# Patient Record
Sex: FEMALE | Race: WHITE | NOT HISPANIC OR LATINO
[De-identification: names, ages, dates, MRNs, and addresses within clinical notes are randomized per-mention and may not be internally consistent; named-entity substitution may affect disease eponyms.]

---

## 2017-02-13 ENCOUNTER — APPOINTMENT (OUTPATIENT)
Dept: INTERNAL MEDICINE | Facility: CLINIC | Age: 42
End: 2017-02-13

## 2017-02-13 VITALS
SYSTOLIC BLOOD PRESSURE: 141 MMHG | WEIGHT: 282 LBS | TEMPERATURE: 98.7 F | HEART RATE: 78 BPM | BODY MASS INDEX: 41.77 KG/M2 | OXYGEN SATURATION: 100 % | RESPIRATION RATE: 12 BRPM | HEIGHT: 69 IN | DIASTOLIC BLOOD PRESSURE: 85 MMHG

## 2017-02-15 LAB
25(OH)D3 SERPL-MCNC: 15.3 NG/ML
ALBUMIN SERPL ELPH-MCNC: 3.9 G/DL
ALP BLD-CCNC: 66 U/L
ALT SERPL-CCNC: 7 U/L
ANION GAP SERPL CALC-SCNC: 18 MMOL/L
AST SERPL-CCNC: 13 U/L
BILIRUB SERPL-MCNC: 0.2 MG/DL
BUN SERPL-MCNC: 16 MG/DL
CALCIUM SERPL-MCNC: 9.5 MG/DL
CHLORIDE SERPL-SCNC: 102 MMOL/L
CHOLEST SERPL-MCNC: 218 MG/DL
CHOLEST/HDLC SERPL: 4.4 RATIO
CO2 SERPL-SCNC: 22 MMOL/L
CREAT SERPL-MCNC: 0.87 MG/DL
GLUCOSE SERPL-MCNC: 93 MG/DL
HBA1C MFR BLD HPLC: 5.5 %
HDLC SERPL-MCNC: 50 MG/DL
LDLC SERPL CALC-MCNC: 137 MG/DL
POTASSIUM SERPL-SCNC: 3.6 MMOL/L
PROT SERPL-MCNC: 7.2 G/DL
SODIUM SERPL-SCNC: 142 MMOL/L
TRIGL SERPL-MCNC: 154 MG/DL
TSH SERPL-ACNC: 1.3 UIU/ML
VIT B12 SERPL-MCNC: 329 PG/ML

## 2017-02-17 LAB
BASOPHILS # BLD AUTO: 0.03 K/UL
BASOPHILS NFR BLD AUTO: 0.4 %
EOSINOPHIL # BLD AUTO: 0.2 K/UL
EOSINOPHIL NFR BLD AUTO: 2.4 %
HCT VFR BLD CALC: 42.3 %
HGB BLD-MCNC: 13.6 G/DL
IMM GRANULOCYTES NFR BLD AUTO: 0.1 %
LYMPHOCYTES # BLD AUTO: 2.32 K/UL
LYMPHOCYTES NFR BLD AUTO: 27.4 %
MAN DIFF?: NORMAL
MCHC RBC-ENTMCNC: 29.2 PG
MCHC RBC-ENTMCNC: 32.2 GM/DL
MCV RBC AUTO: 90.8 FL
MONOCYTES # BLD AUTO: 0.35 K/UL
MONOCYTES NFR BLD AUTO: 4.1 %
NEUTROPHILS # BLD AUTO: 5.57 K/UL
NEUTROPHILS NFR BLD AUTO: 65.6 %
PLATELET # BLD AUTO: 342 K/UL
RBC # BLD: 4.66 M/UL
RBC # FLD: 13.2 %
WBC # FLD AUTO: 8.48 K/UL

## 2018-02-06 ENCOUNTER — LABORATORY RESULT (OUTPATIENT)
Age: 43
End: 2018-02-06

## 2018-02-06 ENCOUNTER — OUTPATIENT (OUTPATIENT)
Dept: OUTPATIENT SERVICES | Facility: HOSPITAL | Age: 43
LOS: 1 days | End: 2018-02-06
Payer: COMMERCIAL

## 2018-02-06 ENCOUNTER — APPOINTMENT (OUTPATIENT)
Dept: OBGYN | Facility: CLINIC | Age: 43
End: 2018-02-06
Payer: COMMERCIAL

## 2018-02-06 VITALS — SYSTOLIC BLOOD PRESSURE: 120 MMHG | WEIGHT: 293 LBS | DIASTOLIC BLOOD PRESSURE: 80 MMHG | BODY MASS INDEX: 43.56 KG/M2

## 2018-02-06 DIAGNOSIS — N76.0 ACUTE VAGINITIS: ICD-10-CM

## 2018-02-06 PROCEDURE — 87624 HPV HI-RISK TYP POOLED RSLT: CPT

## 2018-02-06 PROCEDURE — G0463: CPT

## 2018-02-06 PROCEDURE — 88175 CYTOPATH C/V AUTO FLUID REDO: CPT

## 2018-02-06 PROCEDURE — XXXXX: CPT

## 2018-02-06 PROCEDURE — 88141 CYTOPATH C/V INTERPRET: CPT

## 2018-02-07 LAB
C TRACH RRNA SPEC QL NAA+PROBE: SIGNIFICANT CHANGE UP
HPV HIGH+LOW RISK DNA PNL CVX: SIGNIFICANT CHANGE UP
N GONORRHOEA RRNA SPEC QL NAA+PROBE: SIGNIFICANT CHANGE UP
SPECIMEN SOURCE: SIGNIFICANT CHANGE UP

## 2018-02-11 ENCOUNTER — FORM ENCOUNTER (OUTPATIENT)
Age: 43
End: 2018-02-11

## 2018-02-12 ENCOUNTER — OUTPATIENT (OUTPATIENT)
Dept: OUTPATIENT SERVICES | Facility: HOSPITAL | Age: 43
LOS: 1 days | End: 2018-02-12
Payer: COMMERCIAL

## 2018-02-12 ENCOUNTER — APPOINTMENT (OUTPATIENT)
Dept: MAMMOGRAPHY | Facility: IMAGING CENTER | Age: 43
End: 2018-02-12
Payer: COMMERCIAL

## 2018-02-12 DIAGNOSIS — Z00.8 ENCOUNTER FOR OTHER GENERAL EXAMINATION: ICD-10-CM

## 2018-02-12 PROCEDURE — 77063 BREAST TOMOSYNTHESIS BI: CPT

## 2018-02-12 PROCEDURE — 77067 SCR MAMMO BI INCL CAD: CPT

## 2018-02-12 PROCEDURE — 77063 BREAST TOMOSYNTHESIS BI: CPT | Mod: 26

## 2018-02-12 PROCEDURE — 77067 SCR MAMMO BI INCL CAD: CPT | Mod: 26

## 2018-02-13 LAB — CYTOLOGY SPEC DOC CYTO: SIGNIFICANT CHANGE UP

## 2018-02-15 DIAGNOSIS — Z01.419 ENCOUNTER FOR GYNECOLOGICAL EXAMINATION (GENERAL) (ROUTINE) WITHOUT ABNORMAL FINDINGS: ICD-10-CM

## 2018-04-16 ENCOUNTER — LABORATORY RESULT (OUTPATIENT)
Age: 43
End: 2018-04-16

## 2018-04-16 ENCOUNTER — APPOINTMENT (OUTPATIENT)
Dept: INTERNAL MEDICINE | Facility: CLINIC | Age: 43
End: 2018-04-16
Payer: COMMERCIAL

## 2018-04-16 VITALS
HEART RATE: 80 BPM | TEMPERATURE: 97.7 F | DIASTOLIC BLOOD PRESSURE: 77 MMHG | OXYGEN SATURATION: 98 % | SYSTOLIC BLOOD PRESSURE: 117 MMHG | RESPIRATION RATE: 12 BRPM | HEIGHT: 69 IN | BODY MASS INDEX: 43.4 KG/M2 | WEIGHT: 293 LBS

## 2018-04-16 PROCEDURE — 99214 OFFICE O/P EST MOD 30 MIN: CPT

## 2018-04-23 LAB
HBA1C MFR BLD HPLC: 5.6 %
TSH SERPL-ACNC: 1.8 UIU/ML

## 2018-04-25 LAB
ALBUMIN SERPL ELPH-MCNC: 4.2 G/DL
ALP BLD-CCNC: 68 U/L
ALT SERPL-CCNC: 12 U/L
ANION GAP SERPL CALC-SCNC: 13 MMOL/L
AST SERPL-CCNC: 14 U/L
BILIRUB SERPL-MCNC: 0.3 MG/DL
BUN SERPL-MCNC: 12 MG/DL
CALCIUM SERPL-MCNC: 9.7 MG/DL
CHLORIDE SERPL-SCNC: 102 MMOL/L
CO2 SERPL-SCNC: 25 MMOL/L
CREAT SERPL-MCNC: 0.85 MG/DL
GLUCOSE SERPL-MCNC: 66 MG/DL
POTASSIUM SERPL-SCNC: 4.1 MMOL/L
PROT SERPL-MCNC: 7.5 G/DL
SODIUM SERPL-SCNC: 140 MMOL/L

## 2018-04-30 LAB
HSV 1+2 IGG SER IA-IMP: POSITIVE
HSV 1+2 IGG SER IA-IMP: POSITIVE
HSV1 IGG SER QL: 3.32 INDEX
HSV1 IGM SER QL: NORMAL TITER
HSV2 AB FLD-ACNC: NORMAL TITER
HSV2 IGG SER QL: 8.64 INDEX

## 2018-05-02 LAB
25(OH)D3 SERPL-MCNC: 27.5 NG/ML
APPEARANCE: CLEAR
BILIRUBIN URINE: NEGATIVE
BLOOD URINE: NEGATIVE
CHOLEST SERPL-MCNC: 208 MG/DL
CHOLEST/HDLC SERPL: 4 RATIO
COLOR: YELLOW
GLUCOSE QUALITATIVE U: NEGATIVE MG/DL
HDLC SERPL-MCNC: 52 MG/DL
KETONES URINE: NEGATIVE
LDLC SERPL CALC-MCNC: 133 MG/DL
LEUKOCYTE ESTERASE URINE: ABNORMAL
NITRITE URINE: NEGATIVE
PH URINE: 5.5
PROTEIN URINE: NEGATIVE MG/DL
SPECIFIC GRAVITY URINE: 1.01
TRIGL SERPL-MCNC: 115 MG/DL
UROBILINOGEN URINE: NEGATIVE MG/DL
VIT B12 SERPL-MCNC: 312 PG/ML

## 2018-05-03 DIAGNOSIS — D72.829 ELEVATED WHITE BLOOD CELL COUNT, UNSPECIFIED: ICD-10-CM

## 2018-05-03 LAB
BASOPHILS # BLD AUTO: 0.04 K/UL
BASOPHILS NFR BLD AUTO: 0.3 %
EOSINOPHIL # BLD AUTO: 0.17 K/UL
EOSINOPHIL NFR BLD AUTO: 1.5 %
HCT VFR BLD CALC: 43 %
HGB BLD-MCNC: 13.6 G/DL
IMM GRANULOCYTES NFR BLD AUTO: 0.3 %
LYMPHOCYTES # BLD AUTO: 2.58 K/UL
LYMPHOCYTES NFR BLD AUTO: 22 %
MAN DIFF?: NORMAL
MCHC RBC-ENTMCNC: 29.6 PG
MCHC RBC-ENTMCNC: 31.6 GM/DL
MCV RBC AUTO: 93.5 FL
MONOCYTES # BLD AUTO: 0.61 K/UL
MONOCYTES NFR BLD AUTO: 5.2 %
NEUTROPHILS # BLD AUTO: 8.28 K/UL
NEUTROPHILS NFR BLD AUTO: 70.7 %
PLATELET # BLD AUTO: 336 K/UL
RBC # BLD: 4.6 M/UL
RBC # FLD: 13.8 %
WBC # FLD AUTO: 11.71 K/UL

## 2019-01-02 ENCOUNTER — APPOINTMENT (OUTPATIENT)
Dept: INTERNAL MEDICINE | Facility: CLINIC | Age: 44
End: 2019-01-02
Payer: COMMERCIAL

## 2019-01-02 VITALS
HEART RATE: 98 BPM | DIASTOLIC BLOOD PRESSURE: 85 MMHG | RESPIRATION RATE: 12 BRPM | BODY MASS INDEX: 43.4 KG/M2 | TEMPERATURE: 98.9 F | OXYGEN SATURATION: 96 % | HEIGHT: 69 IN | WEIGHT: 293 LBS | SYSTOLIC BLOOD PRESSURE: 136 MMHG

## 2019-01-02 PROCEDURE — 99214 OFFICE O/P EST MOD 30 MIN: CPT

## 2019-01-02 NOTE — PHYSICAL EXAM
[No Acute Distress] : no acute distress [Well Nourished] : well nourished [Well Developed] : well developed [Normal Sclera/Conjunctiva] : normal sclera/conjunctiva [Normal Outer Ear/Nose] : the outer ears and nose were normal in appearance [No JVD] : no jugular venous distention [No Respiratory Distress] : no respiratory distress  [Clear to Auscultation] : lungs were clear to auscultation bilaterally [No Accessory Muscle Use] : no accessory muscle use [Normal Rate] : normal rate  [Regular Rhythm] : with a regular rhythm [Normal S1, S2] : normal S1 and S2 [No Murmur] : no murmur heard [No Edema] : there was no peripheral edema [Soft] : abdomen soft [Non Tender] : non-tender [Non-distended] : non-distended [No Rash] : no rash [Normal Gait] : normal gait [Normal Affect] : the affect was normal [Normal Insight/Judgement] : insight and judgment were intact [de-identified] : ROM are within normal limits with pt c/o discomfort in rt body rotation
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
## 2019-03-19 ENCOUNTER — LABORATORY RESULT (OUTPATIENT)
Age: 44
End: 2019-03-19

## 2019-03-19 ENCOUNTER — APPOINTMENT (OUTPATIENT)
Dept: OBGYN | Facility: CLINIC | Age: 44
End: 2019-03-19
Payer: COMMERCIAL

## 2019-03-19 VITALS
DIASTOLIC BLOOD PRESSURE: 80 MMHG | HEIGHT: 69 IN | WEIGHT: 293 LBS | BODY MASS INDEX: 43.4 KG/M2 | SYSTOLIC BLOOD PRESSURE: 126 MMHG

## 2019-03-19 DIAGNOSIS — R82.90 UNSPECIFIED ABNORMAL FINDINGS IN URINE: ICD-10-CM

## 2019-03-19 DIAGNOSIS — Z87.440 PERSONAL HISTORY OF URINARY (TRACT) INFECTIONS: ICD-10-CM

## 2019-03-19 DIAGNOSIS — Z87.898 PERSONAL HISTORY OF OTHER SPECIFIED CONDITIONS: ICD-10-CM

## 2019-03-19 PROCEDURE — 99396 PREV VISIT EST AGE 40-64: CPT

## 2019-03-19 NOTE — PHYSICAL EXAM
[Awake] : awake [Alert] : alert [Soft] : soft [Oriented x3] : oriented to person, place, and time [Normal] : uterus [No Bleeding] : there was no active vaginal bleeding [Uterine Adnexae] : were not tender and not enlarged [Acute Distress] : no acute distress [LAD] : no lymphadenopathy [Mass] : no breast mass [Nipple Discharge] : no nipple discharge [Axillary LAD] : no axillary lymphadenopathy [Tender] : non tender [Adnexa Tenderness] : were not tender [Ovarian Mass (___ Cm)] : there were no adnexal masses

## 2019-03-19 NOTE — COUNSELING
[Breast Self Exam] : breast self exam [Nutrition] : nutrition [Exercise] : exercise [Vitamins/Supplements] : vitamins/supplements [Contraception] : contraception [Fertility Options] : fertility options [Weight Management] : weight management [Safe Sexual Practices] : safe sexual practices

## 2019-03-20 LAB
FSH SERPL-MCNC: 13.7 IU/L
PROLACTIN SERPL-MCNC: 9.5 NG/ML
TSH SERPL-ACNC: 1.64 UIU/ML

## 2019-03-28 LAB — CYTOLOGY CVX/VAG DOC THIN PREP: NORMAL

## 2019-04-07 ENCOUNTER — FORM ENCOUNTER (OUTPATIENT)
Age: 44
End: 2019-04-07

## 2019-04-08 ENCOUNTER — APPOINTMENT (OUTPATIENT)
Dept: MAMMOGRAPHY | Facility: IMAGING CENTER | Age: 44
End: 2019-04-08
Payer: COMMERCIAL

## 2019-04-08 ENCOUNTER — OUTPATIENT (OUTPATIENT)
Dept: OUTPATIENT SERVICES | Facility: HOSPITAL | Age: 44
LOS: 1 days | End: 2019-04-08
Payer: COMMERCIAL

## 2019-04-08 DIAGNOSIS — Z01.419 ENCOUNTER FOR GYNECOLOGICAL EXAMINATION (GENERAL) (ROUTINE) WITHOUT ABNORMAL FINDINGS: ICD-10-CM

## 2019-04-08 PROCEDURE — 77067 SCR MAMMO BI INCL CAD: CPT | Mod: 26

## 2019-04-08 PROCEDURE — 77067 SCR MAMMO BI INCL CAD: CPT

## 2019-04-08 PROCEDURE — 77063 BREAST TOMOSYNTHESIS BI: CPT | Mod: 26

## 2019-04-08 PROCEDURE — 77063 BREAST TOMOSYNTHESIS BI: CPT

## 2019-04-22 ENCOUNTER — LABORATORY RESULT (OUTPATIENT)
Age: 44
End: 2019-04-22

## 2019-04-22 ENCOUNTER — APPOINTMENT (OUTPATIENT)
Dept: INTERNAL MEDICINE | Facility: CLINIC | Age: 44
End: 2019-04-22
Payer: COMMERCIAL

## 2019-04-22 VITALS
RESPIRATION RATE: 12 BRPM | WEIGHT: 293 LBS | HEART RATE: 79 BPM | OXYGEN SATURATION: 99 % | SYSTOLIC BLOOD PRESSURE: 111 MMHG | BODY MASS INDEX: 43.4 KG/M2 | HEIGHT: 69 IN | DIASTOLIC BLOOD PRESSURE: 78 MMHG | TEMPERATURE: 98.4 F

## 2019-04-22 PROCEDURE — 99406 BEHAV CHNG SMOKING 3-10 MIN: CPT

## 2019-04-22 PROCEDURE — 99396 PREV VISIT EST AGE 40-64: CPT | Mod: 25

## 2019-04-22 NOTE — COUNSELING
[Healthy eating counseling provided] : healthy eating [Discussed Risks and Advised to Quit Smoking] : Discussed risks and advised to quit smoking [Discussed Cessation Medication] : cessation medication was discussed [Discussed Cessation Strategies] : cessation strategies were discussed

## 2019-04-22 NOTE — PHYSICAL EXAM
[No Acute Distress] : no acute distress [Well Nourished] : well nourished [Normal Sclera/Conjunctiva] : normal sclera/conjunctiva [Well-Appearing] : well-appearing [Well Developed] : well developed [EOMI] : extraocular movements intact [PERRL] : pupils equal round and reactive to light [Normal Oropharynx] : the oropharynx was normal [No JVD] : no jugular venous distention [Normal Outer Ear/Nose] : the outer ears and nose were normal in appearance [No Lymphadenopathy] : no lymphadenopathy [Supple] : supple [Thyroid Normal, No Nodules] : the thyroid was normal and there were no nodules present [No Respiratory Distress] : no respiratory distress  [Clear to Auscultation] : lungs were clear to auscultation bilaterally [No Accessory Muscle Use] : no accessory muscle use [Normal Rate] : normal rate  [Regular Rhythm] : with a regular rhythm [Normal S1, S2] : normal S1 and S2 [No Murmur] : no murmur heard [No Varicosities] : no varicosities [No Carotid Bruits] : no carotid bruits [No Abdominal Bruit] : a ~M bruit was not heard ~T in the abdomen [No Edema] : there was no peripheral edema [Pedal Pulses Present] : the pedal pulses are present [No Extremity Clubbing/Cyanosis] : no extremity clubbing/cyanosis [No Palpable Aorta] : no palpable aorta [Soft] : abdomen soft [Non Tender] : non-tender [Non-distended] : non-distended [No Masses] : no abdominal mass palpated [No HSM] : no HSM [Normal Bowel Sounds] : normal bowel sounds [Normal Posterior Cervical Nodes] : no posterior cervical lymphadenopathy [Normal Anterior Cervical Nodes] : no anterior cervical lymphadenopathy [No Spinal Tenderness] : no spinal tenderness [No CVA Tenderness] : no CVA  tenderness [Grossly Normal Strength/Tone] : grossly normal strength/tone [No Joint Swelling] : no joint swelling [Normal Gait] : normal gait [Coordination Grossly Intact] : coordination grossly intact [No Rash] : no rash [No Focal Deficits] : no focal deficits [Deep Tendon Reflexes (DTR)] : deep tendon reflexes were 2+ and symmetric [Normal Affect] : the affect was normal [Normal Insight/Judgement] : insight and judgment were intact

## 2019-04-22 NOTE — ASSESSMENT
[FreeTextEntry1] : Physical\par blood work ordered\par She is UTD with her gyn/PAP, mammography\par \par Hx of herpes type II\par STD screening \par \par f/u in one week for lab results

## 2019-04-22 NOTE — HISTORY OF PRESENT ILLNESS
[de-identified] : Ms. LYDIA ENGLISH is a 44 year old female presents for annual physical\par She is doing well, offers no complaints\par

## 2019-04-22 NOTE — HEALTH RISK ASSESSMENT
[Alone] : lives alone [Single] : single [Employed] : employed [Sexually Active] : sexually active [] : No [de-identified] : socially [MammogramDate] : last week [PapSmearDate] : march 19, 2019 [FreeTextEntry2] :

## 2019-04-23 LAB
ALBUMIN SERPL ELPH-MCNC: 4.3 G/DL
ALP BLD-CCNC: 78 U/L
ALT SERPL-CCNC: 21 U/L
ANION GAP SERPL CALC-SCNC: 15 MMOL/L
AST SERPL-CCNC: 15 U/L
BASOPHILS # BLD AUTO: 0.06 K/UL
BASOPHILS NFR BLD AUTO: 0.6 %
BILIRUB SERPL-MCNC: 0.2 MG/DL
BUN SERPL-MCNC: 11 MG/DL
CALCIUM SERPL-MCNC: 9.3 MG/DL
CHLORIDE SERPL-SCNC: 106 MMOL/L
CO2 SERPL-SCNC: 20 MMOL/L
CREAT SERPL-MCNC: 0.66 MG/DL
EOSINOPHIL # BLD AUTO: 0.25 K/UL
EOSINOPHIL NFR BLD AUTO: 2.5 %
GLUCOSE SERPL-MCNC: 89 MG/DL
HCT VFR BLD CALC: 44.6 %
HGB BLD-MCNC: 14.1 G/DL
IMM GRANULOCYTES NFR BLD AUTO: 0.2 %
LYMPHOCYTES # BLD AUTO: 2.57 K/UL
LYMPHOCYTES NFR BLD AUTO: 26.1 %
MAN DIFF?: NORMAL
MCHC RBC-ENTMCNC: 29.7 PG
MCHC RBC-ENTMCNC: 31.6 GM/DL
MCV RBC AUTO: 93.9 FL
MONOCYTES # BLD AUTO: 0.54 K/UL
MONOCYTES NFR BLD AUTO: 5.5 %
NEUTROPHILS # BLD AUTO: 6.39 K/UL
NEUTROPHILS NFR BLD AUTO: 65.1 %
PLATELET # BLD AUTO: 336 K/UL
POTASSIUM SERPL-SCNC: 4.5 MMOL/L
PROT SERPL-MCNC: 7 G/DL
RBC # BLD: 4.75 M/UL
RBC # FLD: 13.1 %
SODIUM SERPL-SCNC: 141 MMOL/L
TSH SERPL-ACNC: 0.86 UIU/ML
WBC # FLD AUTO: 9.83 K/UL

## 2019-04-24 ENCOUNTER — APPOINTMENT (OUTPATIENT)
Dept: OBGYN | Facility: CLINIC | Age: 44
End: 2019-04-24
Payer: COMMERCIAL

## 2019-04-24 VITALS
HEIGHT: 69 IN | SYSTOLIC BLOOD PRESSURE: 130 MMHG | WEIGHT: 293 LBS | BODY MASS INDEX: 43.4 KG/M2 | DIASTOLIC BLOOD PRESSURE: 78 MMHG

## 2019-04-24 DIAGNOSIS — Z87.42 PERSONAL HISTORY OF OTHER DISEASES OF THE FEMALE GENITAL TRACT: ICD-10-CM

## 2019-04-24 LAB — HPV HIGH+LOW RISK DNA PNL CVX: DETECTED

## 2019-04-24 PROCEDURE — 57456 ENDOCERV CURETTAGE W/SCOPE: CPT

## 2019-04-24 PROCEDURE — 81025 URINE PREGNANCY TEST: CPT

## 2019-04-24 RX ORDER — CIPROFLOXACIN HYDROCHLORIDE 500 MG/1
500 TABLET, FILM COATED ORAL
Qty: 14 | Refills: 0 | Status: COMPLETED | COMMUNITY
Start: 2017-02-13 | End: 2019-04-24

## 2019-04-24 NOTE — PROCEDURE
[Colposcopy] : colposcopy [ASCUS] : atypical squamous cells of undetermined significance [HPV high risk] : PCR positive for high risk HPV [Patient] : patient [Risks] : risks [Infection] : infection [Benefits] : benefits [Bleeding] : bleeding [Consent Obtained] : written consent was obtained prior to the procedure [No Abnormalities] : no abnormalities [Biopsies Taken: # ___] : no biopsies were taken of the cervix [ECC Done] : Endocervical curettage was performed.  [Tolerated Well] : the patient tolerated the procedure well [No Complications] : there were no complications

## 2019-04-25 LAB
C TRACH RRNA SPEC QL NAA+PROBE: NOT DETECTED
HAV IGM SER QL: NONREACTIVE
HBV CORE IGM SER QL: NONREACTIVE
HBV SURFACE AG SER QL: NONREACTIVE
HCV AB SER QL: NONREACTIVE
HCV S/CO RATIO: 0.11 S/CO
HIV1+2 AB SPEC QL IA.RAPID: NONREACTIVE
HSV1 IGM SER QL: NORMAL TITER
HSV2 AB FLD-ACNC: NORMAL TITER
N GONORRHOEA RRNA SPEC QL NAA+PROBE: NOT DETECTED
SOURCE TP AMPLIFICATION: NORMAL
T PALLIDUM AB SER QL IA: NEGATIVE

## 2019-04-29 LAB
25(OH)D3 SERPL-MCNC: 30.8 NG/ML
CHOLEST SERPL-MCNC: 180 MG/DL
CHOLEST/HDLC SERPL: 3.8 RATIO
ESTIMATED AVERAGE GLUCOSE: 117 MG/DL
HBA1C MFR BLD HPLC: 5.7 %
HDLC SERPL-MCNC: 47 MG/DL
HSV 1+2 IGG SER IA-IMP: POSITIVE
HSV 1+2 IGG SER IA-IMP: POSITIVE
HSV1 IGG SER QL: 3.54 INDEX
HSV2 IGG SER QL: 8.85 INDEX
LDLC SERPL CALC-MCNC: 109 MG/DL
TRIGL SERPL-MCNC: 122 MG/DL
VIT B12 SERPL-MCNC: 467 PG/ML

## 2019-04-30 LAB
APPEARANCE: ABNORMAL
BILIRUBIN URINE: NEGATIVE
BLOOD URINE: NEGATIVE
COLOR: YELLOW
GLUCOSE QUALITATIVE U: NEGATIVE
KETONES URINE: NEGATIVE
LEUKOCYTE ESTERASE URINE: NEGATIVE
NITRITE URINE: NEGATIVE
PH URINE: 6.5
PROTEIN URINE: NORMAL
SPECIFIC GRAVITY URINE: 1.02
UROBILINOGEN URINE: NORMAL

## 2019-05-01 LAB — CORE LAB BIOPSY: NORMAL

## 2019-05-21 ENCOUNTER — APPOINTMENT (OUTPATIENT)
Dept: INTERNAL MEDICINE | Facility: CLINIC | Age: 44
End: 2019-05-21
Payer: COMMERCIAL

## 2019-05-21 ENCOUNTER — NON-APPOINTMENT (OUTPATIENT)
Age: 44
End: 2019-05-21

## 2019-05-21 VITALS
HEART RATE: 80 BPM | RESPIRATION RATE: 12 BRPM | DIASTOLIC BLOOD PRESSURE: 73 MMHG | SYSTOLIC BLOOD PRESSURE: 124 MMHG | BODY MASS INDEX: 43.4 KG/M2 | OXYGEN SATURATION: 99 % | HEIGHT: 69 IN | TEMPERATURE: 99.1 F | WEIGHT: 293 LBS

## 2019-05-21 PROCEDURE — 93000 ELECTROCARDIOGRAM COMPLETE: CPT

## 2019-05-21 PROCEDURE — 99214 OFFICE O/P EST MOD 30 MIN: CPT | Mod: 25

## 2019-06-09 NOTE — PHYSICAL EXAM
[No Acute Distress] : no acute distress [Well Nourished] : well nourished [Well Developed] : well developed [Well-Appearing] : well-appearing [Normal Sclera/Conjunctiva] : normal sclera/conjunctiva [Normal Outer Ear/Nose] : the outer ears and nose were normal in appearance [No JVD] : no jugular venous distention [No Lymphadenopathy] : no lymphadenopathy [No Respiratory Distress] : no respiratory distress  [Clear to Auscultation] : lungs were clear to auscultation bilaterally [No Accessory Muscle Use] : no accessory muscle use [Normal Rate] : normal rate  [Regular Rhythm] : with a regular rhythm [No Murmur] : no murmur heard [Normal S1, S2] : normal S1 and S2 [Soft] : abdomen soft [Non Tender] : non-tender [Normal Anterior Cervical Nodes] : no anterior cervical lymphadenopathy [No CVA Tenderness] : no CVA  tenderness [No Joint Swelling] : no joint swelling [No Rash] : no rash [Normal Gait] : normal gait [Normal Insight/Judgement] : insight and judgment were intact

## 2019-06-09 NOTE — HISTORY OF PRESENT ILLNESS
[de-identified] : 45 yo female, presents stating that she has been unable to loose weight. She has been dieting but is not loosing weight. She has tried different diets in the past and still has not been able to loose weight\par She had taken phentermine in the past and it had helped her and wants to try it again

## 2021-01-11 ENCOUNTER — APPOINTMENT (OUTPATIENT)
Dept: OBGYN | Facility: CLINIC | Age: 46
End: 2021-01-11
Payer: COMMERCIAL

## 2021-01-11 VITALS
WEIGHT: 293 LBS | BODY MASS INDEX: 43.4 KG/M2 | TEMPERATURE: 97.7 F | DIASTOLIC BLOOD PRESSURE: 80 MMHG | HEIGHT: 69 IN | SYSTOLIC BLOOD PRESSURE: 118 MMHG

## 2021-01-11 DIAGNOSIS — I80.00 PHLEBITIS AND THROMBOPHLEBITIS OF SUPERFICIAL VESSELS OF UNSPECIFIED LOWER EXTREMITY: ICD-10-CM

## 2021-01-11 DIAGNOSIS — H92.02 OTALGIA, LEFT EAR: ICD-10-CM

## 2021-01-11 DIAGNOSIS — Z87.39 PERSONAL HISTORY OF OTHER DISEASES OF THE MUSCULOSKELETAL SYSTEM AND CONNECTIVE TISSUE: ICD-10-CM

## 2021-01-11 DIAGNOSIS — Z87.42 PERSONAL HISTORY OF OTHER DISEASES OF THE FEMALE GENITAL TRACT: ICD-10-CM

## 2021-01-11 DIAGNOSIS — A60.00 HERPESVIRAL INFECTION OF UROGENITAL SYSTEM, UNSPECIFIED: ICD-10-CM

## 2021-01-11 DIAGNOSIS — Z11.3 ENCOUNTER FOR SCREENING FOR INFECTIONS WITH A PREDOMINANTLY SEXUAL MODE OF TRANSMISSION: ICD-10-CM

## 2021-01-11 PROCEDURE — 99396 PREV VISIT EST AGE 40-64: CPT

## 2021-01-11 PROCEDURE — 99072 ADDL SUPL MATRL&STAF TM PHE: CPT

## 2021-01-11 RX ORDER — PHENTERMINE HYDROCHLORIDE 37.5 MG/1
37.5 TABLET ORAL DAILY
Qty: 30 | Refills: 0 | Status: DISCONTINUED | COMMUNITY
Start: 2019-05-21 | End: 2021-01-11

## 2021-01-11 RX ORDER — NAPROXEN 500 MG/1
500 TABLET ORAL
Qty: 30 | Refills: 1 | Status: DISCONTINUED | COMMUNITY
Start: 2019-01-02 | End: 2021-01-11

## 2021-01-11 RX ORDER — CYCLOBENZAPRINE HYDROCHLORIDE 10 MG/1
10 TABLET, FILM COATED ORAL
Qty: 10 | Refills: 0 | Status: DISCONTINUED | COMMUNITY
Start: 2019-01-02 | End: 2021-01-11

## 2021-01-11 NOTE — HISTORY OF PRESENT ILLNESS
[FreeTextEntry1] : history of ASCUS/HPV positive\par Negative biopsy  [Mammogramdate] : 2019 [PapSmeardate] : 2019

## 2021-01-11 NOTE — PLAN
[FreeTextEntry1] : History of ASCUS/HPV positive- negative colposcopy \par \par Repeat pap done\par History of HSV-requesting suppression

## 2021-01-13 LAB — HPV HIGH+LOW RISK DNA PNL CVX: NOT DETECTED

## 2021-01-18 LAB — CYTOLOGY CVX/VAG DOC THIN PREP: ABNORMAL

## 2021-02-22 ENCOUNTER — LABORATORY RESULT (OUTPATIENT)
Age: 46
End: 2021-02-22

## 2021-02-22 ENCOUNTER — APPOINTMENT (OUTPATIENT)
Dept: INTERNAL MEDICINE | Facility: CLINIC | Age: 46
End: 2021-02-22
Payer: COMMERCIAL

## 2021-02-22 VITALS
OXYGEN SATURATION: 96 % | HEART RATE: 82 BPM | WEIGHT: 293 LBS | DIASTOLIC BLOOD PRESSURE: 85 MMHG | BODY MASS INDEX: 43.4 KG/M2 | RESPIRATION RATE: 12 BRPM | HEIGHT: 69 IN | TEMPERATURE: 97.5 F | SYSTOLIC BLOOD PRESSURE: 142 MMHG

## 2021-02-22 VITALS — DIASTOLIC BLOOD PRESSURE: 82 MMHG | SYSTOLIC BLOOD PRESSURE: 128 MMHG

## 2021-02-22 PROCEDURE — 99406 BEHAV CHNG SMOKING 3-10 MIN: CPT

## 2021-02-22 PROCEDURE — 99396 PREV VISIT EST AGE 40-64: CPT

## 2021-02-22 PROCEDURE — 99072 ADDL SUPL MATRL&STAF TM PHE: CPT

## 2021-03-02 ENCOUNTER — APPOINTMENT (OUTPATIENT)
Dept: MAMMOGRAPHY | Facility: IMAGING CENTER | Age: 46
End: 2021-03-02
Payer: COMMERCIAL

## 2021-03-02 ENCOUNTER — OUTPATIENT (OUTPATIENT)
Dept: OUTPATIENT SERVICES | Facility: HOSPITAL | Age: 46
LOS: 1 days | End: 2021-03-02
Payer: COMMERCIAL

## 2021-03-02 ENCOUNTER — RESULT REVIEW (OUTPATIENT)
Age: 46
End: 2021-03-02

## 2021-03-02 DIAGNOSIS — Z00.8 ENCOUNTER FOR OTHER GENERAL EXAMINATION: ICD-10-CM

## 2021-03-02 PROCEDURE — 77067 SCR MAMMO BI INCL CAD: CPT | Mod: 26

## 2021-03-02 PROCEDURE — 77067 SCR MAMMO BI INCL CAD: CPT

## 2021-03-02 PROCEDURE — 77063 BREAST TOMOSYNTHESIS BI: CPT | Mod: 26

## 2021-03-02 PROCEDURE — 77063 BREAST TOMOSYNTHESIS BI: CPT

## 2021-03-07 NOTE — HEALTH RISK ASSESSMENT
[] : Yes [Yes] : Yes [Alone] : lives alone [Employed] : employed [] :  [Sexually Active] : sexually active [de-identified] : socially [MammogramDate] : . one year ago [PapSmearDate] : 2 weeks ago [FreeTextEntry2] :

## 2021-03-07 NOTE — ASSESSMENT
[FreeTextEntry1] : Physical\par She is UTD with her PAP\par pt states she has nadine't next week for mammography\par blood work ordered\par declines flu vaccine \par \par follow up in one week for lab results\par

## 2021-03-07 NOTE — HISTORY OF PRESENT ILLNESS
[de-identified] : Ms. LYDIA ENGLISH is a 45 year old female presents for annual physical\par She is doing well. \par Denies SOB, chest pain, abdominal pain, N/V/D, leg swelling, headache, dizziness \par Offers no complaints\par

## 2021-03-07 NOTE — COUNSELING
[Benefits of weight loss discussed] : Benefits of weight loss discussed [Encouraged to increase physical activity] : Encouraged to increase physical activity [Risk of tobacco use and health benefits of smoking cessation discussed] : Risk of tobacco use and health benefits of smoking cessation discussed [Use of nicotine replacement therapies and other medications discussed] : Use of nicotine replacement therapies and other medications discussed

## 2021-03-11 LAB
25(OH)D3 SERPL-MCNC: 45.1 NG/ML
ALBUMIN SERPL ELPH-MCNC: 4.2 G/DL
ALP BLD-CCNC: 84 U/L
ALT SERPL-CCNC: 24 U/L
ANION GAP SERPL CALC-SCNC: 12 MMOL/L
APPEARANCE: ABNORMAL
AST SERPL-CCNC: 23 U/L
BASOPHILS # BLD AUTO: 0.06 K/UL
BASOPHILS NFR BLD AUTO: 0.6 %
BILIRUB SERPL-MCNC: 0.3 MG/DL
BILIRUBIN URINE: NEGATIVE
BLOOD URINE: NEGATIVE
BUN SERPL-MCNC: 9 MG/DL
C TRACH RRNA SPEC QL NAA+PROBE: NOT DETECTED
CALCIUM SERPL-MCNC: 9.4 MG/DL
CHLORIDE SERPL-SCNC: 101 MMOL/L
CO2 SERPL-SCNC: 24 MMOL/L
COLOR: YELLOW
CREAT SERPL-MCNC: 0.68 MG/DL
EOSINOPHIL # BLD AUTO: 0.21 K/UL
EOSINOPHIL NFR BLD AUTO: 2.1 %
ESTIMATED AVERAGE GLUCOSE: 114 MG/DL
GLUCOSE QUALITATIVE U: NEGATIVE
GLUCOSE SERPL-MCNC: 77 MG/DL
HAV IGM SER QL: NONREACTIVE
HBA1C MFR BLD HPLC: 5.6 %
HBV CORE IGM SER QL: NONREACTIVE
HBV SURFACE AG SER QL: NONREACTIVE
HCT VFR BLD CALC: 43.9 %
HCV AB SER QL: NONREACTIVE
HCV S/CO RATIO: 0.07 S/CO
HGB BLD-MCNC: 14.2 G/DL
HIV1+2 AB SPEC QL IA.RAPID: NONREACTIVE
HSV 1+2 IGG SER IA-IMP: POSITIVE
HSV 1+2 IGG SER IA-IMP: POSITIVE
HSV1 IGG SER QL: 1.98 INDEX
HSV1 IGM SER QL: NORMAL TITER
HSV2 AB FLD-ACNC: NORMAL TITER
HSV2 IGG SER QL: 8.23 INDEX
IMM GRANULOCYTES NFR BLD AUTO: 0.3 %
KETONES URINE: NORMAL
LEUKOCYTE ESTERASE URINE: NEGATIVE
LYMPHOCYTES # BLD AUTO: 2.56 K/UL
LYMPHOCYTES NFR BLD AUTO: 25.8 %
MAN DIFF?: NORMAL
MCHC RBC-ENTMCNC: 30.2 PG
MCHC RBC-ENTMCNC: 32.3 GM/DL
MCV RBC AUTO: 93.4 FL
MONOCYTES # BLD AUTO: 0.67 K/UL
MONOCYTES NFR BLD AUTO: 6.7 %
N GONORRHOEA RRNA SPEC QL NAA+PROBE: NOT DETECTED
NEUTROPHILS # BLD AUTO: 6.4 K/UL
NEUTROPHILS NFR BLD AUTO: 64.5 %
NITRITE URINE: NEGATIVE
PH URINE: 5.5
PLATELET # BLD AUTO: 330 K/UL
POTASSIUM SERPL-SCNC: 4.4 MMOL/L
PROT SERPL-MCNC: 7.5 G/DL
PROTEIN URINE: NEGATIVE
RBC # BLD: 4.7 M/UL
RBC # FLD: 13.2 %
SARS-COV-2 IGG SERPL IA-ACNC: 0.06 INDEX
SARS-COV-2 IGG SERPL QL IA: NEGATIVE
SODIUM SERPL-SCNC: 137 MMOL/L
SOURCE TP AMPLIFICATION: NORMAL
SPECIFIC GRAVITY URINE: >=1.03
T PALLIDUM AB SER QL IA: NEGATIVE
TSH SERPL-ACNC: 1.7 UIU/ML
UROBILINOGEN URINE: NORMAL
VIT B12 SERPL-MCNC: 274 PG/ML
WBC # FLD AUTO: 9.93 K/UL

## 2021-04-29 LAB
CHOLEST SERPL-MCNC: 223 MG/DL
HDLC SERPL-MCNC: 45 MG/DL
LDLC SERPL CALC-MCNC: 151 MG/DL
NONHDLC SERPL-MCNC: 178 MG/DL
TRIGL SERPL-MCNC: 134 MG/DL

## 2021-12-05 ENCOUNTER — NON-APPOINTMENT (OUTPATIENT)
Age: 46
End: 2021-12-05

## 2021-12-06 ENCOUNTER — APPOINTMENT (OUTPATIENT)
Dept: VASCULAR SURGERY | Facility: CLINIC | Age: 46
End: 2021-12-06
Payer: COMMERCIAL

## 2021-12-06 ENCOUNTER — NON-APPOINTMENT (OUTPATIENT)
Age: 46
End: 2021-12-06

## 2021-12-06 VITALS
BODY MASS INDEX: 43.4 KG/M2 | SYSTOLIC BLOOD PRESSURE: 131 MMHG | DIASTOLIC BLOOD PRESSURE: 91 MMHG | HEART RATE: 79 BPM | HEIGHT: 69 IN | WEIGHT: 293 LBS | TEMPERATURE: 98.5 F

## 2021-12-06 PROCEDURE — 99203 OFFICE O/P NEW LOW 30 MIN: CPT

## 2021-12-06 PROCEDURE — 93970 EXTREMITY STUDY: CPT

## 2021-12-14 ENCOUNTER — APPOINTMENT (OUTPATIENT)
Dept: OBGYN | Facility: CLINIC | Age: 46
End: 2021-12-14
Payer: COMMERCIAL

## 2021-12-14 DIAGNOSIS — N92.6 IRREGULAR MENSTRUATION, UNSPECIFIED: ICD-10-CM

## 2021-12-14 LAB
HCG UR QL: NEGATIVE
QUALITY CONTROL: YES

## 2021-12-14 PROCEDURE — 99213 OFFICE O/P EST LOW 20 MIN: CPT

## 2021-12-14 PROCEDURE — 81025 URINE PREGNANCY TEST: CPT

## 2021-12-14 NOTE — PLAN
[FreeTextEntry1] : Irregular menses with anovulation\par Offered Provera withdrawal\par Patient declines at this time \par She has an appointment next month and if she has not had her period will take Provera\par Explained rationale for this management

## 2021-12-14 NOTE — HISTORY OF PRESENT ILLNESS
[FreeTextEntry1] : Patient with no menses since 9/14\par She had been told in the course of an infertility workup that she had poor egg quality\par She has a negative UCG

## 2021-12-21 NOTE — ASSESSMENT
[FreeTextEntry1] : In summary, Mrs. Marcos p/w BLE varicose veins. A venous duplex was performed and showed:\par -No evidence of DVT or SVT in either extremity\par -Reflux in bilateral accessory GSV\par -Bilateral calf varicosities\par \par We will schedule her for radiofrequency ablation of bilateral accessory saphenous veins and bilateral stab phlebectomy at her convenience. I provided her a new prescription for knee-high 20-30 mmHg compression stockings.

## 2021-12-21 NOTE — HISTORY OF PRESENT ILLNESS
[FreeTextEntry1] : I have just had the pleasure of seeing Mrs. Jaclyn Marcos in consultation for lower extremity venous insufficiency. Mrs. Marcos is a [place] 46 year old lady who works as a . She presents with a history of spider veins and varicose veins that have been present since childhood. She previously underwent ablation and stripping of her bilateral GSV. She reports that this improved her symptoms. She has been wearing compression stockings. She reports that at times her ankles swell. She denies any history of ulcers. She denies any history of DVT or SVT. She denies any symptoms of claudication, rest pain, or tissue loss. \par \par She denies any history of CAD, MI, CHF, DM, CRI, CVA or TIA.\par \par PMH: STI, LE phlebitis/cellulitis 15 years ago (two episodes), D&C\par \par Meds: Valacyclovir\par \par All: Latex\par \par FH: NC\par \par SH: Quit tobacco use in July of this year. Started smoking at age 13. Smoked 1ppd.

## 2021-12-21 NOTE — PHYSICAL EXAM
[Normal Breath Sounds] : Normal breath sounds [Normal Heart Sounds] : normal heart sounds [2+] : left 2+ [de-identified] : NAD. Neurologically intact. [de-identified] : WNL [FreeTextEntry1] : BLE varicose veins. Edema of the ankles.

## 2022-01-03 ENCOUNTER — APPOINTMENT (OUTPATIENT)
Dept: VASCULAR SURGERY | Facility: CLINIC | Age: 47
End: 2022-01-03

## 2022-01-03 ENCOUNTER — RX RENEWAL (OUTPATIENT)
Age: 47
End: 2022-01-03

## 2022-01-04 LAB — SARS-COV-2 N GENE NPH QL NAA+PROBE: DETECTED

## 2022-01-24 ENCOUNTER — APPOINTMENT (OUTPATIENT)
Dept: OBGYN | Facility: CLINIC | Age: 47
End: 2022-01-24
Payer: COMMERCIAL

## 2022-01-24 VITALS
BODY MASS INDEX: 43.4 KG/M2 | WEIGHT: 293 LBS | SYSTOLIC BLOOD PRESSURE: 143 MMHG | HEIGHT: 69 IN | DIASTOLIC BLOOD PRESSURE: 84 MMHG

## 2022-01-24 DIAGNOSIS — Z01.419 ENCOUNTER FOR GYNECOLOGICAL EXAMINATION (GENERAL) (ROUTINE) W/OUT ABNORMAL FINDINGS: ICD-10-CM

## 2022-01-24 DIAGNOSIS — R87.810 ATYPICAL SQUAMOUS CELLS OF UNDETERMINED SIGNIFICANCE ON CYTOLOGIC SMEAR OF CERVIX (ASC-US): ICD-10-CM

## 2022-01-24 DIAGNOSIS — R87.610 ATYPICAL SQUAMOUS CELLS OF UNDETERMINED SIGNIFICANCE ON CYTOLOGIC SMEAR OF CERVIX (ASC-US): ICD-10-CM

## 2022-01-24 PROCEDURE — 99396 PREV VISIT EST AGE 40-64: CPT

## 2022-01-24 NOTE — HISTORY OF PRESENT ILLNESS
[FreeTextEntry1] : history of HPV positive 2020\par Negative 2021  [Patient reported mammogram was normal] : Patient reported mammogram was normal [Patient reported PAP Smear was normal] : Patient reported PAP Smear was normal [Mammogramdate] : 2021 [PapSmeardate] : 2021

## 2022-01-25 ENCOUNTER — TRANSCRIPTION ENCOUNTER (OUTPATIENT)
Age: 47
End: 2022-01-25

## 2022-01-25 LAB
HIV1+2 AB SPEC QL IA.RAPID: NONREACTIVE
HPV HIGH+LOW RISK DNA PNL CVX: NOT DETECTED
T PALLIDUM AB SER QL IA: NEGATIVE

## 2022-01-26 LAB
C TRACH RRNA SPEC QL NAA+PROBE: NOT DETECTED
N GONORRHOEA RRNA SPEC QL NAA+PROBE: NOT DETECTED
SOURCE AMPLIFICATION: NORMAL

## 2022-01-31 ENCOUNTER — APPOINTMENT (OUTPATIENT)
Dept: VASCULAR SURGERY | Facility: CLINIC | Age: 47
End: 2022-01-31

## 2022-01-31 LAB — CYTOLOGY CVX/VAG DOC THIN PREP: ABNORMAL

## 2022-02-01 LAB — SARS-COV-2 N GENE NPH QL NAA+PROBE: NOT DETECTED

## 2022-02-03 ENCOUNTER — APPOINTMENT (OUTPATIENT)
Dept: VASCULAR SURGERY | Facility: CLINIC | Age: 47
End: 2022-02-03
Payer: COMMERCIAL

## 2022-02-03 PROCEDURE — 36475 ENDOVENOUS RF 1ST VEIN: CPT | Mod: RT

## 2022-02-03 RX ORDER — LIDOCAINE HYDROCHLORIDE 10 MG/ML
1 INJECTION, SOLUTION INFILTRATION; PERINEURAL
Qty: 1 | Refills: 0 | Status: COMPLETED | COMMUNITY
Start: 2022-01-03 | End: 2022-02-03

## 2022-02-03 RX ORDER — SODIUM BICARBONATE 84 MG/ML
8.4 INJECTION, SOLUTION INTRAVENOUS
Qty: 1 | Refills: 0 | Status: COMPLETED | COMMUNITY
Start: 2022-01-28 | End: 2022-02-03

## 2022-02-03 RX ORDER — SODIUM CHLORIDE 9 G/ML
0.9 INJECTION, SOLUTION INTRAVENOUS
Qty: 1 | Refills: 0 | Status: COMPLETED | COMMUNITY
Start: 2022-01-28 | End: 2022-02-03

## 2022-02-03 RX ORDER — SODIUM BICARBONATE 84 MG/ML
8.4 INJECTION, SOLUTION INTRAVENOUS
Qty: 1 | Refills: 0 | Status: COMPLETED | COMMUNITY
Start: 2022-01-03 | End: 2022-02-03

## 2022-02-03 RX ORDER — SODIUM CHLORIDE 9 G/ML
0.9 INJECTION, SOLUTION INTRAVENOUS
Qty: 1 | Refills: 0 | Status: COMPLETED | COMMUNITY
Start: 2022-01-03 | End: 2022-02-03

## 2022-02-03 RX ORDER — LIDOCAINE HYDROCHLORIDE 10 MG/ML
1 INJECTION, SOLUTION INFILTRATION; PERINEURAL
Qty: 1 | Refills: 0 | Status: COMPLETED | COMMUNITY
Start: 2022-01-28 | End: 2022-02-03

## 2022-02-03 NOTE — PROCEDURE
[FreeTextEntry1] : right AGSV RFA [FreeTextEntry3] : Procedural safety checklist and time out completed:\par Confirmed patient identification (Patient Name, , and/or medical record number including when possible affirmation by patient or parent/family/other).\par Confirmed procedure with the patient. Consent present, accurate and signed. \par Confirmed special equipment and supplies are present.\par Sterility confirmed. Position verified. \par Site/ side is marked and visible and confirmed. \par Procedure confirmed by consent. Accurate consent including side and site.\par Review of medical records, including venous ultrasound, noting correct procedure including site and side.\par MD/PA verifies presence and review of imaging studies and or written report of imaging studies.\par Agreement on the procedure to be performed\par Time out completed.\par All of the above has been confirmed by the team.\par All patient-specific concerns have been addressed. \par \par Indication: right lower extremity varicose veins with inflammation, leg pain, leg swelling, and leg cramping.  Venous insufficiency/ reflux.\par \par Procedure: radiofrequency ablation of the right anterior great saphenous vein. \par 	\par Ms. LYDIA ENGLISH is a 46 year old F with a history of right lower extremity varicose veins previously seen in the office.  Ultrasound examination demonstrated venous insufficiency. A trial of compression stockings, exercise, elevation, and pain medication was attempted without relief and definitive treatment with radiofrequency ablation was offered. \par \par The patient has come for radiofrequency ablation treatment of the right anterior great saphenous vein.\par I have discussed the risks of the procedure at length with the patient. The risks discussed were inclusive of but not limited to infection, irritation at the site of infiltration of local anesthesia, and also rare risk of deep venous thrombosis and pulmonary emboli. The patient agrees to proceed with the procedure. \par The patient was escorted into the procedure room and a time out called.\par The entire limb was prepped and draped in sterile fashion. The RF fiber was placed on the sterile field and connected by a sterile cable. Actuation, temperature, and impedance testing were performed to ensure that all components were connected and operating properly. \par The patient was placed on the procedure table and local anesthesia was instilled in the skin overlying the access site. Under ultrasound guidance, the vein was punctured with a micropuncture needle, using the anterior wall technique. A guide wire was now introduced through the needle, and the needle was then exchanged over the guide wire for a 7F sheath. The guide wire was removed and the RF probe was then placed into the right anterior great saphenous vein through the sheath and position confirmed using ultrasound guidance. After the RF probe position was verified by ultrasound, tumescent anesthesia consisting of normal saline, 1% lidocaine with 8.4% sodium bicarbonate was infiltrated, under ultrasound guidance, precisely into the perivenous compartment along the entire length of the vein until a “halo” of fluid was noted around the vein. After RF probe position was again confirmed with ultrasound imaging, RF energy was applied. The probe was gradually and carefully withdrawn at a rate of 6.5cm/20seconds. \par \par 4 cycles of RF performed using the 7 cm probe with 2 access points of the R AGSV\par Total treatment time was 80 seconds.\par The total volume injected was 450 cc\par Treatment length was 20 cm total and \par The probe is > 3.5 cm from the SFJ.\par \par Estimated Blood Loss: minimal\par Repeat ultrasound of the treated vein was performed confirming successful treatment. The catheter and sheath were withdrawn and hemostasis established with direct pressure. After assuring hemostasis, a sterile 4x4 was placed on the access site and an ACE compression wrap was applied. Patient tolerated procedure well. Patient was given post-procedure instructions and follow up appointment was scheduled.\par \par \par

## 2022-02-07 ENCOUNTER — APPOINTMENT (OUTPATIENT)
Dept: VASCULAR SURGERY | Facility: CLINIC | Age: 47
End: 2022-02-07
Payer: COMMERCIAL

## 2022-02-07 PROCEDURE — 93971 EXTREMITY STUDY: CPT

## 2022-02-28 ENCOUNTER — APPOINTMENT (OUTPATIENT)
Dept: VASCULAR SURGERY | Facility: CLINIC | Age: 47
End: 2022-02-28
Payer: COMMERCIAL

## 2022-02-28 ENCOUNTER — LABORATORY RESULT (OUTPATIENT)
Age: 47
End: 2022-02-28

## 2022-02-28 PROCEDURE — 93971 EXTREMITY STUDY: CPT | Mod: RT

## 2022-03-03 ENCOUNTER — APPOINTMENT (OUTPATIENT)
Dept: VASCULAR SURGERY | Facility: CLINIC | Age: 47
End: 2022-03-03
Payer: COMMERCIAL

## 2022-03-03 ENCOUNTER — APPOINTMENT (OUTPATIENT)
Dept: VASCULAR SURGERY | Facility: CLINIC | Age: 47
End: 2022-03-03

## 2022-03-03 PROCEDURE — 37766 PHLEB VEINS - EXTREM 20+: CPT | Mod: RT

## 2022-03-03 RX ORDER — SODIUM CHLORIDE 9 G/ML
0.9 INJECTION, SOLUTION INTRAVENOUS
Qty: 1 | Refills: 0 | Status: COMPLETED | COMMUNITY
Start: 2022-02-25 | End: 2022-03-03

## 2022-03-03 RX ORDER — SODIUM BICARBONATE 84 MG/ML
8.4 INJECTION, SOLUTION INTRAVENOUS
Qty: 1 | Refills: 0 | Status: COMPLETED | COMMUNITY
Start: 2022-02-25 | End: 2022-03-03

## 2022-03-03 RX ORDER — RIVAROXABAN 15 MG/1
15 TABLET, FILM COATED ORAL
Qty: 42 | Refills: 0 | Status: COMPLETED | COMMUNITY
Start: 2022-02-07 | End: 2022-03-03

## 2022-03-03 RX ORDER — LIDOCAINE HYDROCHLORIDE 10 MG/ML
1 INJECTION, SOLUTION INFILTRATION; PERINEURAL
Qty: 1 | Refills: 0 | Status: COMPLETED | COMMUNITY
Start: 2022-02-25 | End: 2022-03-03

## 2022-03-03 RX ORDER — VALACYCLOVIR 500 MG/1
500 TABLET, FILM COATED ORAL
Qty: 90 | Refills: 3 | Status: COMPLETED | COMMUNITY
Start: 2021-01-11 | End: 2022-03-03

## 2022-03-03 NOTE — PROCEDURE
[FreeTextEntry1] : right stab phlebectomy [FreeTextEntry3] : Procedural safety checklist and time out completed:\par Confirmed patient identification (Patient Name, , and/or medical record number including when possible affirmation by patient or parent/family/other.\par Confirmed procedure with the patient. Consent present, accurate and signed. \par Confirmed special equipment and supplies are present.\par Sterility confirmed. Position verified. \par Site/ side is marked and visible and confirmed. \par Procedure confirmed by consent. Accurate consent including side and site.\par Review of medical records noting correct procedure including site and side.\par MD/PA verifies presence and review of imaging studies and or written report of imaging studies.\par Specify equipment are available for the planned procedure.\par MD/PA has marked the patient's procedural site and side.\par Agreement on the procedure to be performed\par Time out completed.\par All of the above has been confirmed by the team.\par All patient-specific concerns have been addressed. \par \par Indication:  right lower extremity varicose veins with inflammation, leg pain, leg swelling, and leg cramping.  \par \par Procedure: Stab phlebectomy right  lower extremity\par \par  Ms. LYDIA ENGLISH is a 46 year old F with a history of symptomatic right lower extremity varicose veins. A trial of compression stockings, exercise, elevation, and pain medication was attempted without relief and definitive treatment with microphlebectomy was offered. \par \par I have discussed the risks of the procedure at length with the patient. The risks discussed were inclusive of but not limited to infection, irritation at the site of infiltration of local anesthesia, and also rare risk of deep venous thrombosis and pulmonary emboli. The patient agrees to proceed with the procedure. \par The patient was escorted into the procedure room, the varicose veins for treatment were marked out and the patient placed on the examination table. The entire limb was prepped and draped in sterile fashion and a  time out was called. \par \par Local anesthesia using 40 cc 1% lidocaine was infiltrated using a 25 gauge needle over the previously marked prominent varicose vein sites.\par Multiple small stab incisions, each less than 1 cm in length was made at the noted sites. With the help of a vein hook, the vein was fished out at each of these sites, rolled over a narrow-tipped mosquito clamp and removed. Hemostasis was secured with leg elevation and application of manual pressure. After assuring hemostasis, a sterile 4x4 was placed on the access sites and an ACE compression wrap was applied. Estimated blood loss: minimal. Patient tolerated procedure well. Patient was given post-procedure instructions and follow up appointment was scheduled. \par \par SIDE - RIGHT\par SITE - CALF / THIGH\par LOCATION - MEDIAL / ANTERIOR / POSTERIOR	\par Total Stab Incisions > 20\par \par

## 2022-03-07 ENCOUNTER — APPOINTMENT (OUTPATIENT)
Dept: MAMMOGRAPHY | Facility: IMAGING CENTER | Age: 47
End: 2022-03-07
Payer: COMMERCIAL

## 2022-03-07 ENCOUNTER — OUTPATIENT (OUTPATIENT)
Dept: OUTPATIENT SERVICES | Facility: HOSPITAL | Age: 47
LOS: 1 days | End: 2022-03-07
Payer: COMMERCIAL

## 2022-03-07 ENCOUNTER — RESULT REVIEW (OUTPATIENT)
Age: 47
End: 2022-03-07

## 2022-03-07 DIAGNOSIS — Z00.8 ENCOUNTER FOR OTHER GENERAL EXAMINATION: ICD-10-CM

## 2022-03-07 PROCEDURE — 77067 SCR MAMMO BI INCL CAD: CPT | Mod: 26

## 2022-03-07 PROCEDURE — 77063 BREAST TOMOSYNTHESIS BI: CPT

## 2022-03-07 PROCEDURE — 77067 SCR MAMMO BI INCL CAD: CPT

## 2022-03-07 PROCEDURE — 77063 BREAST TOMOSYNTHESIS BI: CPT | Mod: 26

## 2022-04-04 ENCOUNTER — APPOINTMENT (OUTPATIENT)
Dept: VASCULAR SURGERY | Facility: CLINIC | Age: 47
End: 2022-04-04

## 2022-04-07 ENCOUNTER — APPOINTMENT (OUTPATIENT)
Dept: VASCULAR SURGERY | Facility: CLINIC | Age: 47
End: 2022-04-07
Payer: COMMERCIAL

## 2022-04-07 PROCEDURE — 36475 ENDOVENOUS RF 1ST VEIN: CPT | Mod: LT

## 2022-04-07 RX ORDER — SODIUM BICARBONATE 84 MG/ML
8.4 INJECTION, SOLUTION INTRAVENOUS
Qty: 1 | Refills: 0 | Status: COMPLETED | COMMUNITY
Start: 2022-03-31 | End: 2022-04-07

## 2022-04-07 RX ORDER — SODIUM CHLORIDE 9 G/ML
0.9 INJECTION, SOLUTION INTRAVENOUS
Qty: 1 | Refills: 0 | Status: COMPLETED | COMMUNITY
Start: 2022-03-31 | End: 2022-04-07

## 2022-04-07 RX ORDER — LIDOCAINE HYDROCHLORIDE 10 MG/ML
1 INJECTION, SOLUTION INFILTRATION; PERINEURAL
Qty: 1 | Refills: 0 | Status: COMPLETED | COMMUNITY
Start: 2022-03-31 | End: 2022-04-07

## 2022-04-07 NOTE — PROCEDURE
[FreeTextEntry1] : left AGSV RFA [FreeTextEntry3] : Procedural safety checklist and time out completed:\par Confirmed patient identification (Patient Name, , and/or medical record number including when possible affirmation by patient or parent/family/other).\par Confirmed procedure with the patient. Consent present, accurate and signed. \par Confirmed special equipment and supplies are present.\par Sterility confirmed. Position verified. \par Site/ side is marked and visible and confirmed. \par Procedure confirmed by consent. Accurate consent including side and site.\par Review of medical records, including venous ultrasound, noting correct procedure including site and side.\par MD/PA verifies presence and review of imaging studies and or written report of imaging studies.\par Agreement on the procedure to be performed\par Time out completed.\par All of the above has been confirmed by the team.\par All patient-specific concerns have been addressed. \par \par Indication: left  lower extremity varicose veins with inflammation, leg pain, leg swelling, and leg cramping.  Venous insufficiency/ reflux.\par \par Procedure: radiofrequency ablation of the left anterior great saphenous vein. \par 	\par Ms. LYDIA ENGLISH is a 47 year old F with a history of  left lower extremity varicose veins previously seen in the office.  Ultrasound examination demonstrated venous insufficiency. A trial of compression stockings, exercise, elevation, and pain medication was attempted without relief and definitive treatment with radiofrequency ablation was offered. \par \par The patient has come for radiofrequency ablation treatment of the left anterior great  saphenous vein.\par I have discussed the risks of the procedure at length with the patient. The risks discussed were inclusive of but not limited to infection, irritation at the site of infiltration of local anesthesia, and also rare risk of deep venous thrombosis and pulmonary emboli. The patient agrees to proceed with the procedure. \par The patient was escorted into the procedure room and a time out called.\par The entire limb was prepped and draped in sterile fashion. The RF fiber was placed on the sterile field and connected by a sterile cable. Actuation, temperature, and impedance testing were performed to ensure that all components were connected and operating properly. \par The patient was placed on the procedure table and local anesthesia was instilled in the skin overlying the access site. Under ultrasound guidance, the vein was punctured with a micropuncture needle, using the anterior wall technique. A guide wire was now introduced through the needle, and the needle was then exchanged over the guide wire for a 7F sheath. The guide wire was removed and the RF probe was then placed into the left anterior great saphenous vein through the sheath and position confirmed using ultrasound guidance. After the RF probe position was verified by ultrasound, tumescent anesthesia consisting of normal saline, 1% lidocaine with 8.4% sodium bicarbonate was infiltrated, under ultrasound guidance, precisely into the perivenous compartment along the entire length of the vein until a “halo” of fluid was noted around the vein. After RF probe position was again confirmed with ultrasound imaging, RF energy was applied. The probe was gradually and carefully withdrawn at a rate of 6.5cm/20seconds. \par \par 2 cycles of RF performed using the 3 cm probe\par Total treatment time was 40 seconds.\par The total volume injected was 300 cc\par Treatment length was 5.5 cm and \par The probe is 3.6 cm from the SFJ.\par \par Estimated Blood Loss: minimal\par Repeat ultrasound of the treated vein was performed confirming successful treatment. The catheter and sheath were withdrawn and hemostasis established with direct pressure. After assuring hemostasis, a sterile 4x4 was placed on the access site and an ACE compression wrap was applied. Patient tolerated procedure well. Patient was given post-procedure instructions and follow up appointment was scheduled.\par \par \par

## 2022-04-11 ENCOUNTER — APPOINTMENT (OUTPATIENT)
Dept: VASCULAR SURGERY | Facility: CLINIC | Age: 47
End: 2022-04-11
Payer: COMMERCIAL

## 2022-04-11 PROCEDURE — 93971 EXTREMITY STUDY: CPT

## 2022-05-02 ENCOUNTER — APPOINTMENT (OUTPATIENT)
Dept: VASCULAR SURGERY | Facility: CLINIC | Age: 47
End: 2022-05-02

## 2022-05-05 ENCOUNTER — APPOINTMENT (OUTPATIENT)
Dept: VASCULAR SURGERY | Facility: CLINIC | Age: 47
End: 2022-05-05
Payer: COMMERCIAL

## 2022-05-05 ENCOUNTER — APPOINTMENT (OUTPATIENT)
Dept: VASCULAR SURGERY | Facility: CLINIC | Age: 47
End: 2022-05-05

## 2022-05-05 DIAGNOSIS — I83.893 VARICOSE VEINS OF BILATERAL LOWER EXTREMITIES WITH OTHER COMPLICATIONS: ICD-10-CM

## 2022-05-05 PROCEDURE — 37766 PHLEB VEINS - EXTREM 20+: CPT | Mod: LT

## 2022-05-05 RX ORDER — LIDOCAINE HYDROCHLORIDE 10 MG/ML
1 INJECTION, SOLUTION INFILTRATION; PERINEURAL
Qty: 1 | Refills: 0 | Status: COMPLETED | COMMUNITY
Start: 2022-04-25 | End: 2022-05-05

## 2022-05-05 RX ORDER — SODIUM CHLORIDE 9 G/ML
0.9 INJECTION, SOLUTION INTRAVENOUS
Qty: 1 | Refills: 0 | Status: COMPLETED | COMMUNITY
Start: 2022-04-25 | End: 2022-05-05

## 2022-05-05 RX ORDER — SODIUM BICARBONATE 84 MG/ML
8.4 INJECTION, SOLUTION INTRAVENOUS
Qty: 1 | Refills: 0 | Status: COMPLETED | COMMUNITY
Start: 2022-04-25 | End: 2022-05-05

## 2022-05-05 NOTE — PROCEDURE
[FreeTextEntry1] : left stab phlebectomy [FreeTextEntry3] : Procedural safety checklist and time out completed:\par Confirmed patient identification (Patient Name, , and/or medical record number including when possible affirmation by patient or parent/family/other.\par Confirmed procedure with the patient. Consent present, accurate and signed. \par Confirmed special equipment and supplies are present.\par Sterility confirmed. Position verified. \par Site/ side is marked and visible and confirmed. \par Procedure confirmed by consent. Accurate consent including side and site.\par Review of medical records noting correct procedure including site and side.\par MD/PA verifies presence and review of imaging studies and or written report of imaging studies.\par Specify equipment are available for the planned procedure.\par MD/PA has marked the patient's procedural site and side.\par Agreement on the procedure to be performed\par Time out completed.\par All of the above has been confirmed by the team.\par All patient-specific concerns have been addressed. \par \par Indication: left lower extremity varicose veins with inflammation, leg pain, leg swelling, and leg cramping.  \par \par Procedure: Stab phlebectomy left lower extremity\par \par  Ms. LYDIA ENGLISH is a 47 year old F with a history of symptomatic left lower extremity varicose veins. A trial of compression stockings, exercise, elevation, and pain medication was attempted without relief and definitive treatment with microphlebectomy was offered. \par \par I have discussed the risks of the procedure at length with the patient. The risks discussed were inclusive of but not limited to infection, irritation at the site of infiltration of local anesthesia, and also rare risk of deep venous thrombosis and pulmonary emboli. The patient agrees to proceed with the procedure. \par The patient was escorted into the procedure room, the varicose veins for treatment were marked out and the patient placed on the examination table. The entire limb was prepped and draped in sterile fashion and a  time out was called. \par \par Local anesthesia using 50 cc 1% lidocaine was infiltrated using a 25 gauge needle over the previously marked prominent varicose vein sites.\par Multiple small stab incisions, each less than 1 cm in length was made at the noted sites. With the help of a vein hook, the vein was fished out at each of these sites, rolled over a narrow-tipped mosquito clamp and removed. Hemostasis was secured with leg elevation and application of manual pressure. After assuring hemostasis, a sterile 4x4 was placed on the access sites and an ACE compression wrap was applied. Estimated blood loss: minimal. Patient tolerated procedure well. Patient was given post-procedure instructions and follow up appointment was scheduled. \par \par SIDE - LEFT	\par SITE - CALF / THIGH\par LOCATION - MEDIAL / LATERAL / ANTERIOR / POSTERIOR	\par Total Stab Incisions > 20\par \par

## 2022-05-10 ENCOUNTER — NON-APPOINTMENT (OUTPATIENT)
Age: 47
End: 2022-05-10

## 2022-06-09 ENCOUNTER — APPOINTMENT (OUTPATIENT)
Dept: VASCULAR SURGERY | Facility: CLINIC | Age: 47
End: 2022-06-09
Payer: COMMERCIAL

## 2022-06-09 PROCEDURE — 99441: CPT

## 2022-06-09 NOTE — HISTORY OF PRESENT ILLNESS
[Home] : at home, [unfilled] , at the time of the visit. [Medical Office: ([facility])___] : at the medical office located in  [Verbal consent obtained from patient] : the patient, [unfilled] [FreeTextEntry1] : I have just had the pleasure of following up with Mrs. Jaclyn Marcos for lower extremity venous insufficiency. Mrs. Marcos is a [place] 47 year old lady who works as a . She presented with a history of spider veins and varicose veins that have been present since childhood. She previously underwent ablation and stripping of her bilateral GSV. She reported that this improved her symptoms. She has been wearing compression stockings. She reported that at times her ankles swell. She denied any history of ulcers. She denied any history of DVT or SVT. She denied any symptoms of claudication, rest pain, or tissue loss. \par \par Mrs. Marcos underwent right AGSV RFA on 2/3/22 and stab phlebectomy on 3/3/22 and left AGSV RFA on 4/7/22 and left stab phlebectomy on 5/5/22. 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 Her left AGSV remained patent (short length) and no additional treatment was planned. \par Post-procedure she is doing well. She is not compliant with daily stockings use. She reports that she developed blisters over the left leg phlebectomy sites where steri strips were applied, but these are healing. She denies any signs or symptoms of infection and is otherwise doing well. \par \par She denies any history of CAD, MI, CHF, DM, CRI, CVA or TIA.\par \par PMH: STI, LE phlebitis/cellulitis 15 years ago (two episodes), D&C\par \par Meds: Valacyclovir\par \par All: Latex\par \par FH: NC\par \par SH: Quit tobacco use in July of this year. Started smoking at age 13. Smoked 1ppd.

## 2022-06-09 NOTE — ASSESSMENT
[FreeTextEntry1] : In summary, Mrs. Marcos presents s/p treatment of BLE CVI. I instructed her to continue with compression stockings, elevation and exercise. I instructed her to apply sunscreen to the legs to minimize discoloration of wounds. She will contact me if she has any new concerns.

## 2022-07-11 ENCOUNTER — APPOINTMENT (OUTPATIENT)
Dept: INTERNAL MEDICINE | Facility: CLINIC | Age: 47
End: 2022-07-11

## 2022-07-11 VITALS
BODY MASS INDEX: 43.4 KG/M2 | HEART RATE: 64 BPM | HEIGHT: 69 IN | DIASTOLIC BLOOD PRESSURE: 80 MMHG | TEMPERATURE: 97.5 F | RESPIRATION RATE: 12 BRPM | OXYGEN SATURATION: 100 % | WEIGHT: 293 LBS | SYSTOLIC BLOOD PRESSURE: 124 MMHG

## 2022-07-11 DIAGNOSIS — Z00.00 ENCOUNTER FOR GENERAL ADULT MEDICAL EXAMINATION W/OUT ABNORMAL FINDINGS: ICD-10-CM

## 2022-07-11 PROCEDURE — 99396 PREV VISIT EST AGE 40-64: CPT

## 2022-07-11 NOTE — HISTORY OF PRESENT ILLNESS
[de-identified] : \par Ms. LYDIA ENGLISH is a 47 year old female presents for annual physical\par She is doing well.  \par She reports she has been feeling more fatigue and has multiple joint pains, getting increasingly worse over the last 2 months\par She currently exercises 2-3 times/week and is watching her diet\par States she saw " a diet Dr " and was started on Ozempic about 2 weeks ago \par Denies SOB, chest pain, abdominal pain, N/V/D, leg swelling, headache, dizziness \par Offers no complaints\par \par \par

## 2022-07-11 NOTE — PHYSICAL EXAM

## 2022-07-11 NOTE — HEALTH RISK ASSESSMENT
[Good] : ~his/her~  mood as  good [Yes] : Yes [0] : 2) Feeling down, depressed, or hopeless: Not at all (0) [PHQ-2 Negative - No further assessment needed] : PHQ-2 Negative - No further assessment needed [Patient reported mammogram was normal] : Patient reported mammogram was normal [Patient reported PAP Smear was normal] : Patient reported PAP Smear was normal [None] : None [de-identified] : quit smoking 1 year ago  [de-identified] : Socially  [MammogramDate] : 03/2022 [PapSmearDate] : 03/2022 [ColonoscopyDate] : Never

## 2022-07-11 NOTE — ASSESSMENT
[FreeTextEntry1] : Physical \par \par UTD with mammogram and PAP\par \par Referral for GI \par \par Obesity \par Low sodium, low cholesterol diet/ increased physical activity \par started on Ozempic 0.5 mg weekly by "a diet Dr "\par \par Fatigue \par We'll check labs \par \par Multiple site joint pains\par we'll check arthralgia panel\par  \par blood work ordered\par \par pt to follow-up in 1 week for results \par

## 2022-07-14 LAB
25(OH)D3 SERPL-MCNC: 35.2 NG/ML
ALBUMIN SERPL ELPH-MCNC: 4.5 G/DL
ALP BLD-CCNC: 83 U/L
ALT SERPL-CCNC: 25 U/L
ANA PAT FLD IF-IMP: ABNORMAL
ANA SER IF-ACNC: ABNORMAL
ANION GAP SERPL CALC-SCNC: 16 MMOL/L
APPEARANCE: CLEAR
AST SERPL-CCNC: 24 U/L
BACTERIA: NEGATIVE
BASOPHILS # BLD AUTO: 0.05 K/UL
BASOPHILS NFR BLD AUTO: 0.7 %
BILIRUB SERPL-MCNC: 0.2 MG/DL
BILIRUBIN URINE: NEGATIVE
BLOOD URINE: NEGATIVE
BUN SERPL-MCNC: 19 MG/DL
CALCIUM SERPL-MCNC: 9.6 MG/DL
CCP AB SER IA-ACNC: 10 UNITS
CHLORIDE SERPL-SCNC: 103 MMOL/L
CHOLEST SERPL-MCNC: 207 MG/DL
CO2 SERPL-SCNC: 20 MMOL/L
COLOR: YELLOW
COVID-19 NUCLEOCAPSID  GAM ANTIBODY INTERPRETATION: POSITIVE
COVID-19 SPIKE DOMAIN ANTIBODY INTERPRETATION: POSITIVE
CREAT SERPL-MCNC: 0.8 MG/DL
CRP SERPL-MCNC: 8 MG/L
EGFR: 91 ML/MIN/1.73M2
EOSINOPHIL # BLD AUTO: 0.14 K/UL
EOSINOPHIL NFR BLD AUTO: 1.8 %
ERYTHROCYTE [SEDIMENTATION RATE] IN BLOOD BY WESTERGREN METHOD: 43 MM/HR
ESTIMATED AVERAGE GLUCOSE: 120 MG/DL
GLUCOSE QUALITATIVE U: NEGATIVE
GLUCOSE SERPL-MCNC: 63 MG/DL
HBA1C MFR BLD HPLC: 5.8 %
HCT VFR BLD CALC: 44.2 %
HDLC SERPL-MCNC: 57 MG/DL
HGB BLD-MCNC: 14 G/DL
HYALINE CASTS: 2 /LPF
IMM GRANULOCYTES NFR BLD AUTO: 0.1 %
KETONES URINE: NEGATIVE
LDLC SERPL CALC-MCNC: 123 MG/DL
LEUKOCYTE ESTERASE URINE: NEGATIVE
LYMPHOCYTES # BLD AUTO: 2.32 K/UL
LYMPHOCYTES NFR BLD AUTO: 30.3 %
MAN DIFF?: NORMAL
MCHC RBC-ENTMCNC: 29.7 PG
MCHC RBC-ENTMCNC: 31.7 GM/DL
MCV RBC AUTO: 93.6 FL
MICROSCOPIC-UA: NORMAL
MONOCYTES # BLD AUTO: 0.61 K/UL
MONOCYTES NFR BLD AUTO: 8 %
NEUTROPHILS # BLD AUTO: 4.52 K/UL
NEUTROPHILS NFR BLD AUTO: 59.1 %
NITRITE URINE: NEGATIVE
NONHDLC SERPL-MCNC: 150 MG/DL
PH URINE: 6.5
PLATELET # BLD AUTO: 362 K/UL
POTASSIUM SERPL-SCNC: 4.7 MMOL/L
PROT SERPL-MCNC: 7.5 G/DL
PROTEIN URINE: NORMAL
RBC # BLD: 4.72 M/UL
RBC # FLD: 12.9 %
RED BLOOD CELLS URINE: 5 /HPF
RF+CCP IGG SER-IMP: NEGATIVE
RHEUMATOID FACT SER QL: <10 IU/ML
SARS-COV-2 AB SERPL IA-ACNC: >250 U/ML
SARS-COV-2 AB SERPL QL IA: 51.9 INDEX
SODIUM SERPL-SCNC: 138 MMOL/L
SPECIFIC GRAVITY URINE: 1.02
SQUAMOUS EPITHELIAL CELLS: 3 /HPF
TRIGL SERPL-MCNC: 133 MG/DL
TSH SERPL-ACNC: 1.1 UIU/ML
URATE SERPL-MCNC: 6.1 MG/DL
UROBILINOGEN URINE: NORMAL
VIT B12 SERPL-MCNC: 620 PG/ML
WBC # FLD AUTO: 7.65 K/UL
WHITE BLOOD CELLS URINE: 1 /HPF

## 2022-08-22 ENCOUNTER — NON-APPOINTMENT (OUTPATIENT)
Age: 47
End: 2022-08-22

## 2022-09-20 ENCOUNTER — APPOINTMENT (OUTPATIENT)
Dept: GASTROENTEROLOGY | Facility: CLINIC | Age: 47
End: 2022-09-20

## 2022-09-20 VITALS
HEIGHT: 69 IN | WEIGHT: 293 LBS | SYSTOLIC BLOOD PRESSURE: 120 MMHG | OXYGEN SATURATION: 98 % | HEART RATE: 72 BPM | DIASTOLIC BLOOD PRESSURE: 85 MMHG | BODY MASS INDEX: 43.4 KG/M2

## 2022-09-20 DIAGNOSIS — Z80.0 ENCOUNTER FOR SCREENING FOR MALIGNANT NEOPLASM OF COLON: ICD-10-CM

## 2022-09-20 DIAGNOSIS — Z12.11 ENCOUNTER FOR SCREENING FOR MALIGNANT NEOPLASM OF COLON: ICD-10-CM

## 2022-09-20 PROCEDURE — 99204 OFFICE O/P NEW MOD 45 MIN: CPT

## 2022-09-20 NOTE — ASSESSMENT
[FreeTextEntry1] : LYDIA ENGLISH was advised to undergo colonoscopy to which she agreed. The procedure will be performed in [facility] \par [facility] with the assistance of an anesthesiologist. The patient was given a Suprep preparation prescription and understood the \par procedure as it was explained to her. She was given a booklet distributed by the American Society of Gastrointestinal\par  Endoscopy explaining the procedure in detail and she understood the risks of the procedure not limited to infection, bleeding,\par perforation or non- diagnosis of colorectal cancer. She was advised that she could not drive home, if she chooses to\par  receive sedation.\par \par Further diagnostic and treatment recommendations will be based upon the procedure and any biopsies, if they are taken.\par \par Thank you for allowing me to participate in this [facility] care.\par \par , Best personal regards -- Don\par

## 2022-09-20 NOTE — HISTORY OF PRESENT ILLNESS
[FreeTextEntry1] : She is a 47-year-old asymptomatic female referred for a screening colonoscopy.  She has a family history of colon cancer specifically her grandfather.

## 2022-09-20 NOTE — CONSULT LETTER
[Dear  ___] : Dear  [unfilled], [Consult Letter:] : I had the pleasure of evaluating your patient, [unfilled]. [( Thank you for referring [unfilled] for consultation for _____ )] : Thank you for referring [unfilled] for consultation for [unfilled] [Please see my note below.] : Please see my note below. [Consult Closing:] : Thank you very much for allowing me to participate in the care of this patient.  If you have any questions, please do not hesitate to contact me. [Sincerely,] : Sincerely, [FreeTextEntry3] : Don\par \par Johnson Marquez MD\par \par Gastroenterology\par [facility]\par [facility]\par \par

## 2022-10-24 ENCOUNTER — APPOINTMENT (OUTPATIENT)
Dept: RHEUMATOLOGY | Facility: CLINIC | Age: 47
End: 2022-10-24

## 2022-10-24 VITALS
BODY MASS INDEX: 43.4 KG/M2 | HEART RATE: 74 BPM | HEIGHT: 69 IN | WEIGHT: 293 LBS | RESPIRATION RATE: 16 BRPM | OXYGEN SATURATION: 98 % | DIASTOLIC BLOOD PRESSURE: 82 MMHG | TEMPERATURE: 98 F | SYSTOLIC BLOOD PRESSURE: 125 MMHG

## 2022-10-24 DIAGNOSIS — R53.83 OTHER FATIGUE: ICD-10-CM

## 2022-10-24 DIAGNOSIS — M25.50 PAIN IN UNSPECIFIED JOINT: ICD-10-CM

## 2022-10-24 PROCEDURE — 99205 OFFICE O/P NEW HI 60 MIN: CPT

## 2022-11-08 ENCOUNTER — APPOINTMENT (OUTPATIENT)
Dept: GASTROENTEROLOGY | Facility: AMBULATORY SURGERY CENTER | Age: 47
End: 2022-11-08

## 2022-11-08 PROCEDURE — G0105: CPT

## 2022-11-09 LAB
ALBUMIN SERPL ELPH-MCNC: 4.6 G/DL
ALP BLD-CCNC: 97 U/L
ALT SERPL-CCNC: 32 U/L
ANA PAT FLD IF-IMP: ABNORMAL
ANA SER IF-ACNC: ABNORMAL
ANION GAP SERPL CALC-SCNC: 13 MMOL/L
APPEARANCE: ABNORMAL
APTT BLD: 30.7 SEC
AST SERPL-CCNC: 17 U/L
B2 GLYCOPROT1 AB SER QL: NEGATIVE
BACTERIA: ABNORMAL
BASOPHILS # BLD AUTO: 0.04 K/UL
BASOPHILS NFR BLD AUTO: 0.5 %
BILIRUB SERPL-MCNC: 0.4 MG/DL
BILIRUBIN URINE: NEGATIVE
BLOOD URINE: NEGATIVE
BUN SERPL-MCNC: 13 MG/DL
CALCIUM SERPL-MCNC: 9.6 MG/DL
CARDIOLIPIN AB SER IA-ACNC: NEGATIVE
CENTROMERE IGG SER-ACNC: <0.2 CD:130001892
CHLORIDE SERPL-SCNC: 100 MMOL/L
CO2 SERPL-SCNC: 28 MMOL/L
COLOR: YELLOW
CREAT SERPL-MCNC: 0.81 MG/DL
CREAT SPEC-SCNC: 248 MG/DL
CREAT/PROT UR: 0.1 RATIO
CRP SERPL-MCNC: 9 MG/L
DSDNA AB SER-ACNC: 12 IU/ML
EGFR: 90 ML/MIN/1.73M2
ENA RNP AB SER IA-ACNC: <0.2 AL
ENA SCL70 IGG SER IA-ACNC: <0.2 AL
ENA SM AB SER IA-ACNC: <0.2 AL
ENA SS-A AB SER IA-ACNC: <0.2 AL
ENA SS-B AB SER IA-ACNC: <0.2 AL
EOSINOPHIL # BLD AUTO: 0.12 K/UL
EOSINOPHIL NFR BLD AUTO: 1.5 %
ERYTHROCYTE [SEDIMENTATION RATE] IN BLOOD BY WESTERGREN METHOD: 15 MM/HR
GLUCOSE QUALITATIVE U: NEGATIVE
HCT VFR BLD CALC: 44.7 %
HGB BLD-MCNC: 14.5 G/DL
HYALINE CASTS: 18 /LPF
IMM GRANULOCYTES NFR BLD AUTO: 0.2 %
KETONES URINE: NEGATIVE
LEUKOCYTE ESTERASE URINE: NEGATIVE
LYMPHOCYTES # BLD AUTO: 1.95 K/UL
LYMPHOCYTES NFR BLD AUTO: 24 %
MAN DIFF?: NORMAL
MCHC RBC-ENTMCNC: 30.2 PG
MCHC RBC-ENTMCNC: 32.4 GM/DL
MCV RBC AUTO: 93.1 FL
MICROSCOPIC-UA: NORMAL
MONOCYTES # BLD AUTO: 0.36 K/UL
MONOCYTES NFR BLD AUTO: 4.4 %
NEUTROPHILS # BLD AUTO: 5.64 K/UL
NEUTROPHILS NFR BLD AUTO: 69.4 %
NITRITE URINE: NEGATIVE
PH URINE: 7
PLATELET # BLD AUTO: 335 K/UL
POTASSIUM SERPL-SCNC: 4.4 MMOL/L
PROT SERPL-MCNC: 7.4 G/DL
PROT UR-MCNC: 16 MG/DL
PROTEIN URINE: ABNORMAL
RBC # BLD: 4.8 M/UL
RBC # FLD: 12.2 %
RED BLOOD CELLS URINE: 3 /HPF
RNA POLYMERASE III IGG: 7 UNITS
SODIUM SERPL-SCNC: 141 MMOL/L
SPECIFIC GRAVITY URINE: 1.03
SQUAMOUS EPITHELIAL CELLS: >27 /HPF
THYROGLOB AB SERPL-ACNC: <20 IU/ML
THYROPEROXIDASE AB SERPL IA-ACNC: <10 IU/ML
UROBILINOGEN URINE: NORMAL
WBC # FLD AUTO: 8.13 K/UL
WHITE BLOOD CELLS URINE: 4 /HPF

## 2022-11-15 ENCOUNTER — APPOINTMENT (OUTPATIENT)
Dept: RHEUMATOLOGY | Facility: CLINIC | Age: 47
End: 2022-11-15

## 2022-11-15 DIAGNOSIS — E66.9 OBESITY, UNSPECIFIED: ICD-10-CM

## 2022-11-15 DIAGNOSIS — R76.8 OTHER SPECIFIED ABNORMAL IMMUNOLOGICAL FINDINGS IN SERUM: ICD-10-CM

## 2022-11-15 PROCEDURE — 99212 OFFICE O/P EST SF 10 MIN: CPT | Mod: 95

## 2022-11-15 NOTE — REASON FOR VISIT
[Home] : at home, [unfilled] , at the time of the visit. [Other Location: e.g. Home (Enter Location, City,State)___] : at [unfilled] [Patient] : the patient [Follow-Up: _____] : a [unfilled] follow-up visit

## 2022-12-06 NOTE — HISTORY OF PRESENT ILLNESS
[FreeTextEntry1] : Patient started working out last year, boot camp, and joints were hurting and she couldn't do jumping jacks.  joint pain never got better. she is feeling fatigued.  Her limbs "feel heavy"\par she works as a  and spends all the time on  her feet. \par She has history of obesity but feels like it has never stopped her before. She has not been exercising lately as her membership at the gym .\par \par No fever, rash, or recent illness. joints feel heavy but not swollen. No eye pain/redness/change in vision.  No sores in the mouth or nose.  No difficulty swallowing.  No chest pain or shortness of breath.  No abdominal complaints or weight loss.  No weakness.  No headaches or focal neurological deficits.  No urinary changes.  No other new symptoms.\par \par These symptoms have been present for a year. \par \par Labs reviewed: RAIMUNDO low positive; RF/CCP negative\par inflammatory markers are elevated\par \par SoHx: social drinking \par vaping (nicotine) \par \par Allergies: pineapple\par \par Interval history\par ____________ \par Review of systems is essentially unchanged from prior visit 2022. \par Blood work done at the time of the last visit reviewed with the patient in great detail.\par RAIMUNDO was found to be positive but all on serology are negative.\par \par \par

## 2022-12-06 NOTE — ASSESSMENT
[FreeTextEntry1] : 1. Positive RAIMUNDO\par There is no evidence of underlying autoimmune disease at this time.\par 2.  Musculoskeletal complaints may be related to underlying obesity.  Weight loss encouraged.  Patient is working with her primary care physician on management of weight.  She started Ozempic and has had some results.\par Weight loss encouraged.\par Low impact exercises encouraged\par \par Follow-up 6 to 12 months, sooner as needed.

## 2023-01-30 ENCOUNTER — RX RENEWAL (OUTPATIENT)
Age: 48
End: 2023-01-30

## 2023-03-15 ENCOUNTER — NON-APPOINTMENT (OUTPATIENT)
Age: 48
End: 2023-03-15

## 2023-03-28 ENCOUNTER — APPOINTMENT (OUTPATIENT)
Dept: OBGYN | Facility: CLINIC | Age: 48
End: 2023-03-28
Payer: COMMERCIAL

## 2023-03-28 VITALS — BODY MASS INDEX: 47.85 KG/M2 | SYSTOLIC BLOOD PRESSURE: 137 MMHG | WEIGHT: 293 LBS | DIASTOLIC BLOOD PRESSURE: 87 MMHG

## 2023-03-28 DIAGNOSIS — Z20.822 ENCOUNTER FOR PREPROCEDURAL LABORATORY EXAMINATION: ICD-10-CM

## 2023-03-28 DIAGNOSIS — Z23 ENCOUNTER FOR IMMUNIZATION: ICD-10-CM

## 2023-03-28 DIAGNOSIS — Z01.812 ENCOUNTER FOR PREPROCEDURAL LABORATORY EXAMINATION: ICD-10-CM

## 2023-03-28 DIAGNOSIS — Z12.11 ENCOUNTER FOR SCREENING FOR MALIGNANT NEOPLASM OF COLON: ICD-10-CM

## 2023-03-28 PROCEDURE — 99396 PREV VISIT EST AGE 40-64: CPT

## 2023-03-28 NOTE — PLAN
[FreeTextEntry1] : Discuss skipped menses and to call if patient skips more than 6 months \par explained risk of endometrial hyperplasia and cancer\par Discussed weight gain of 10 pounds and to try and loss some weight

## 2023-03-28 NOTE — HISTORY OF PRESENT ILLNESS
[FreeTextEntry1] : Patient starting to skip menses\par Some night sweats and disturbed sleep  [Patient reported mammogram was normal] : Patient reported mammogram was normal [Patient reported PAP Smear was normal] : Patient reported PAP Smear was normal [Patient reported colonoscopy was normal] : Patient reported colonoscopy was normal [Mammogramdate] : 2022 [PapSmeardate] : 2022 [ColonoscopyDate] : 2023

## 2023-03-29 LAB
C TRACH RRNA SPEC QL NAA+PROBE: NOT DETECTED
HIV1+2 AB SPEC QL IA.RAPID: NONREACTIVE
N GONORRHOEA RRNA SPEC QL NAA+PROBE: NOT DETECTED
SOURCE AMPLIFICATION: NORMAL
T PALLIDUM AB SER QL IA: NEGATIVE

## 2023-04-04 ENCOUNTER — APPOINTMENT (OUTPATIENT)
Dept: MAMMOGRAPHY | Facility: IMAGING CENTER | Age: 48
End: 2023-04-04
Payer: COMMERCIAL

## 2023-04-04 ENCOUNTER — RESULT REVIEW (OUTPATIENT)
Age: 48
End: 2023-04-04

## 2023-04-04 ENCOUNTER — OUTPATIENT (OUTPATIENT)
Dept: OUTPATIENT SERVICES | Facility: HOSPITAL | Age: 48
LOS: 1 days | End: 2023-04-04
Payer: COMMERCIAL

## 2023-04-04 DIAGNOSIS — Z00.00 ENCOUNTER FOR GENERAL ADULT MEDICAL EXAMINATION WITHOUT ABNORMAL FINDINGS: ICD-10-CM

## 2023-04-04 PROCEDURE — 77063 BREAST TOMOSYNTHESIS BI: CPT

## 2023-04-04 PROCEDURE — 77067 SCR MAMMO BI INCL CAD: CPT

## 2023-04-04 PROCEDURE — 77063 BREAST TOMOSYNTHESIS BI: CPT | Mod: 26

## 2023-04-04 PROCEDURE — 77067 SCR MAMMO BI INCL CAD: CPT | Mod: 26

## 2023-06-13 ENCOUNTER — NON-APPOINTMENT (OUTPATIENT)
Age: 48
End: 2023-06-13

## 2023-06-13 ENCOUNTER — APPOINTMENT (OUTPATIENT)
Dept: ORTHOPEDIC SURGERY | Facility: CLINIC | Age: 48
End: 2023-06-13
Payer: COMMERCIAL

## 2023-06-13 VITALS — BODY MASS INDEX: 43.4 KG/M2 | HEIGHT: 69 IN | WEIGHT: 293 LBS

## 2023-06-13 DIAGNOSIS — M25.561 PAIN IN RIGHT KNEE: ICD-10-CM

## 2023-06-13 PROCEDURE — 99203 OFFICE O/P NEW LOW 30 MIN: CPT | Mod: 25

## 2023-06-13 PROCEDURE — 73564 X-RAY EXAM KNEE 4 OR MORE: CPT | Mod: RT

## 2023-06-13 PROCEDURE — 20610 DRAIN/INJ JOINT/BURSA W/O US: CPT | Mod: RT

## 2023-06-20 ENCOUNTER — NON-APPOINTMENT (OUTPATIENT)
Age: 48
End: 2023-06-20

## 2023-10-16 ENCOUNTER — LABORATORY RESULT (OUTPATIENT)
Age: 48
End: 2023-10-16

## 2023-10-16 ENCOUNTER — APPOINTMENT (OUTPATIENT)
Dept: INTERNAL MEDICINE | Facility: CLINIC | Age: 48
End: 2023-10-16
Payer: COMMERCIAL

## 2023-10-16 VITALS
SYSTOLIC BLOOD PRESSURE: 132 MMHG | OXYGEN SATURATION: 98 % | WEIGHT: 293 LBS | DIASTOLIC BLOOD PRESSURE: 84 MMHG | RESPIRATION RATE: 12 BRPM | HEIGHT: 69 IN | HEART RATE: 84 BPM | BODY MASS INDEX: 43.4 KG/M2

## 2023-10-16 PROCEDURE — G0444 DEPRESSION SCREEN ANNUAL: CPT | Mod: 59

## 2023-10-16 PROCEDURE — 99396 PREV VISIT EST AGE 40-64: CPT | Mod: 25

## 2023-10-23 ENCOUNTER — APPOINTMENT (OUTPATIENT)
Dept: INTERNAL MEDICINE | Facility: CLINIC | Age: 48
End: 2023-10-23
Payer: COMMERCIAL

## 2023-10-23 DIAGNOSIS — Z91.89 OTHER SPECIFIED PERSONAL RISK FACTORS, NOT ELSEWHERE CLASSIFIED: ICD-10-CM

## 2023-10-23 PROCEDURE — 99214 OFFICE O/P EST MOD 30 MIN: CPT | Mod: 95

## 2023-10-30 LAB
25(OH)D3 SERPL-MCNC: 38.7 NG/ML
ALBUMIN SERPL ELPH-MCNC: 4.6 G/DL
ALP BLD-CCNC: 100 U/L
ALT SERPL-CCNC: 32 U/L
ANION GAP SERPL CALC-SCNC: 17 MMOL/L
APPEARANCE: ABNORMAL
AST SERPL-CCNC: 17 U/L
BASOPHILS # BLD AUTO: 0.06 K/UL
BASOPHILS NFR BLD AUTO: 0.7 %
BILIRUB SERPL-MCNC: 0.3 MG/DL
BILIRUBIN URINE: NEGATIVE
BLOOD URINE: NEGATIVE
BUN SERPL-MCNC: 9 MG/DL
CALCIUM SERPL-MCNC: 9.6 MG/DL
CHLORIDE SERPL-SCNC: 101 MMOL/L
CHOLEST SERPL-MCNC: 206 MG/DL
CO2 SERPL-SCNC: 24 MMOL/L
COLOR: NORMAL
CREAT SERPL-MCNC: 0.73 MG/DL
EGFR: 101 ML/MIN/1.73M2
EOSINOPHIL # BLD AUTO: 0.13 K/UL
EOSINOPHIL NFR BLD AUTO: 1.5 %
ESTIMATED AVERAGE GLUCOSE: 126 MG/DL
GLUCOSE QUALITATIVE U: NEGATIVE MG/DL
GLUCOSE SERPL-MCNC: 104 MG/DL
HBA1C MFR BLD HPLC: 6 %
HCT VFR BLD CALC: 44.9 %
HDLC SERPL-MCNC: 61 MG/DL
HGB BLD-MCNC: 14.4 G/DL
IMM GRANULOCYTES NFR BLD AUTO: 0.5 %
KETONES URINE: NEGATIVE MG/DL
LDLC SERPL CALC-MCNC: 122 MG/DL
LEUKOCYTE ESTERASE URINE: NEGATIVE
LYMPHOCYTES # BLD AUTO: 2.13 K/UL
LYMPHOCYTES NFR BLD AUTO: 24 %
MAN DIFF?: NORMAL
MCHC RBC-ENTMCNC: 29.7 PG
MCHC RBC-ENTMCNC: 32.1 GM/DL
MCV RBC AUTO: 92.6 FL
MONOCYTES # BLD AUTO: 0.51 K/UL
MONOCYTES NFR BLD AUTO: 5.7 %
NEUTROPHILS # BLD AUTO: 6 K/UL
NEUTROPHILS NFR BLD AUTO: 67.6 %
NITRITE URINE: NEGATIVE
NONHDLC SERPL-MCNC: 145 MG/DL
PH URINE: 6
PLATELET # BLD AUTO: 340 K/UL
POTASSIUM SERPL-SCNC: 4.3 MMOL/L
PROT SERPL-MCNC: 7.4 G/DL
PROTEIN URINE: NEGATIVE MG/DL
RBC # BLD: 4.85 M/UL
RBC # FLD: 12.9 %
SODIUM SERPL-SCNC: 141 MMOL/L
SPECIFIC GRAVITY URINE: 1.02
TRIGL SERPL-MCNC: 131 MG/DL
TSH SERPL-ACNC: 1.58 UIU/ML
UROBILINOGEN URINE: 1 MG/DL
VIT B12 SERPL-MCNC: 1122 PG/ML
WBC # FLD AUTO: 8.87 K/UL

## 2024-02-08 ENCOUNTER — RESULT REVIEW (OUTPATIENT)
Age: 49
End: 2024-02-08

## 2024-02-12 ENCOUNTER — APPOINTMENT (OUTPATIENT)
Dept: SURGERY | Facility: CLINIC | Age: 49
End: 2024-02-12
Payer: COMMERCIAL

## 2024-02-12 VITALS
BODY MASS INDEX: 43.4 KG/M2 | RESPIRATION RATE: 15 BRPM | SYSTOLIC BLOOD PRESSURE: 140 MMHG | OXYGEN SATURATION: 98 % | WEIGHT: 293 LBS | TEMPERATURE: 97.8 F | DIASTOLIC BLOOD PRESSURE: 85 MMHG | HEART RATE: 69 BPM | HEIGHT: 69 IN

## 2024-02-12 PROCEDURE — 99205 OFFICE O/P NEW HI 60 MIN: CPT

## 2024-02-12 NOTE — ASSESSMENT
[FreeTextEntry1] : 40-year-old female longstanding history of class IV obesity (height 5 feet 9 inches, weight 346 pounds, BMI 51) with comorbidities of prediabetes and high cholesterol.  She presents for evaluation for bariatric surgery.  She is interested in the sleeve gastrectomy.  The patient has failed multiple prior attempts at weight loss and is now dealing with the side effects, risk factors, and poor quality of life associated with morbid obesity.  They would benefit from surgical weight loss as outlined in the NIH and  ASMBS consensus statements on bariatric surgery.  Surgical intervention is medically indicated.  My impression is that the patient is a reasonable candidate for laparoscopic sleeve gastrectomy, pending GI workup.

## 2024-02-12 NOTE — HISTORY OF PRESENT ILLNESS
[de-identified] : Jaclyn is a 47 y/o female here for weight loss surgery consultation. She works as a , has previously tried ozempic and wegovy but was only covered for 2 months by insurance and has not been able to get more. Her heaving weight is 360, her lowest that she can recall is 260, her goal is 200. She is currently pre-diabetic, and has previously had surgery for ruptured ectopic pregnancy that was done laparoscopically. She denies any history of PE/DVT, no blood thinner use. She does report to snoring at night, and family members have told her she stops breathing at times while she is sleeping.  She is interested in surgery, specifically sleeve gastrectomy to help improve her weight and her overall health and wellbeing.  She is now struggling more with mobility and joint pain, directly attributable to her increased weight.  Recent colonoscopy 1-2 years ago was reportedly normal.  Past medical history: Prediabetes, obstructive sleep apnea, high cholesterol Past surgical history: Laparoscopic surgery for ectopic pregnancy She reports no reflux or dysphagia She reports no history of blood clots or abnormal bleeding

## 2024-02-12 NOTE — PHYSICAL EXAM
[Obese, well nourished, in no acute distress] : obese, well nourished, in no acute distress [Normal] : affect appropriate [de-identified] : Normal respirations [de-identified] : Soft, nontender, nondistended

## 2024-02-20 ENCOUNTER — NON-APPOINTMENT (OUTPATIENT)
Age: 49
End: 2024-02-20

## 2024-02-24 ENCOUNTER — RX RENEWAL (OUTPATIENT)
Age: 49
End: 2024-02-24

## 2024-02-24 RX ORDER — VALACYCLOVIR 500 MG/1
500 TABLET, FILM COATED ORAL
Qty: 90 | Refills: 3 | Status: ACTIVE | COMMUNITY
Start: 2022-01-24 | End: 1900-01-01

## 2024-02-27 ENCOUNTER — APPOINTMENT (OUTPATIENT)
Dept: SURGERY | Facility: CLINIC | Age: 49
End: 2024-02-27
Payer: COMMERCIAL

## 2024-02-27 PROCEDURE — 97802 MEDICAL NUTRITION INDIV IN: CPT | Mod: 95

## 2024-03-04 ENCOUNTER — APPOINTMENT (OUTPATIENT)
Dept: PSYCHIATRY | Facility: CLINIC | Age: 49
End: 2024-03-04
Payer: COMMERCIAL

## 2024-03-04 DIAGNOSIS — Z78.9 OTHER SPECIFIED HEALTH STATUS: ICD-10-CM

## 2024-03-04 DIAGNOSIS — Z72.0 TOBACCO USE: ICD-10-CM

## 2024-03-04 PROCEDURE — 90791 PSYCH DIAGNOSTIC EVALUATION: CPT | Mod: 95

## 2024-03-07 NOTE — REASON FOR VISIT
[Home] : at home, [unfilled] , at the time of the visit. [Other Location: e.g. Home (Enter Location, City,State)___] : at [unfilled] [Patient] : the patient [Initial Consult] : an initial consult for [Morbid Obesity (BMI>40)] : morbid obesity (bmi>40) [Referring By:  ___] : ~[address]~ was referred for psychological evaluation by Dr. HERNANDEZ [Attempted Weight Loss] : attempted weight loss [Commitment to Modified Lifestyle] : commitment to a modified lifestyle pre and post surgery [Difficulties with Diet Compliance] : difficulties with diet compliance  [Expectations of Outcome] : expectations of outcome [Motivation for Selecting Surgery] : motivation for selecting surgery [Strength of Social Support System] : strength of social support system [Patient Understands Data May be Shared] : patient understands that the information discussed during the evaluation would be shared with referring provider and possibly with ~his/her~ insurance provider [de-identified] : confidentiality and its limits were discussed  [de-identified] : for evaluation of psychological appropriateness for bariatric surgery

## 2024-03-07 NOTE — DISCUSSION/SUMMARY
[Behavior plan developed] : Behavior plan developed [Develop and refine illness management to behavior plan] : Develop and refine illness management to behavior plan [Strategies to improve adherence identified] : Strategies to improve adherence identified [Identify, practice refine strategies to promote adherence to regimen] : Identify, practice refine strategies to promote adherence to regimen [FreeTextEntry1] : Based on the information presented in this assessment, Ms. ENGLISH does not have any current psychological contraindications for bariatric surgery. She is cleared for surgery from a psychological perspective. [de-identified] : Psychological factors (overeating, poor dietary choices) affecting other medical conditions (obesity). Obesity  [FreeTextEntry3] : Behavioral strategies were discussed to increase her coping skills and assist her in making lifestyle modifications, including eliminating eating in front of the television. It was recommended that she attend the post-surgery group sessions for further education and support to assist her in making lifestyle changes. Additionally, it was recommended that she utilize writer and RD as needed to assist in making pre-surgical and post-surgical dietary and behavioral changes. [FreeTextEntry5] : compliance with behavioral and dietary recommendations [FreeTextEntry6] : reduced risk of medical sequelae of obesity   [FreeTextEntry8] : Reduced pain, increased motility
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
## 2024-03-07 NOTE — PHYSICAL EXAM
[Normal] : good [FreeTextEntry7] : at 15 made a suicide attempt-- took a bottle of tylenol-- told best friend--  [AH] : no auditory hallucinations [VH] : no visual hallucinations [Suicidal Ideation] : no suicidal ideation [Homicidal Ideation] : no homicidal ideation [FreeTextEntry8] : "good, relaxed"
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
## 2024-03-07 NOTE — HISTORY OF PRESENT ILLNESS
[Genetics] : genetics [Large Portions] : large portions [Poor Choices] : poor choices [Decrease Activity] : decrease activity [Emotional Eating] : emotional eating [de-identified] : Eats meals in front of TV-- alone, no kids, pets--  [de-identified] : Pt's motivation for surgery is to improve motility, energy, reduce pain and reduce risk for obesity related sequelae. Per pt, her highest weight was 360 lbs around 2010 and her lowest weight was 200lbs in her late teens.  Her stated goal weight is 200 lbs and she would be happy with expected weight of 224 lbs. She expresses intent to make behavioral and dietary changes towards obtaining optimal results. [de-identified] : discussions with people who've had it done; discussions with doctor;  [de-identified] : sleeve gastrectomy   [de-identified] : none.  Pt denies ED hx and bxs, including binge eating.   [de-identified] : A current typical day of eating is reported as follows: 8:30 Coffee B: 9:30 Instant Oatmeal, or protein shake S: 11:30 yogurt with granola L: 1-2 pm okra and chicken D: 6:30 PB& J sandwich, florez 2 eggs with hashbrowns, or turkey burgers & carb, or pasta/rice and protein Snack: throughout the evening sunflower seeds Drink: water [de-identified] : weight watchers 6 times, elena day, yara garrett, boni, [place], keto, going to the gym, personal trainers, cross fit, [employer] boot camp. Despite multiple attempts at diet and exercise modifications, patient reports inability to maintain weight loss.

## 2024-03-11 ENCOUNTER — APPOINTMENT (OUTPATIENT)
Dept: SURGERY | Facility: CLINIC | Age: 49
End: 2024-03-11
Payer: COMMERCIAL

## 2024-03-11 VITALS
TEMPERATURE: 97.3 F | OXYGEN SATURATION: 98 % | WEIGHT: 293 LBS | BODY MASS INDEX: 43.4 KG/M2 | HEIGHT: 69 IN | DIASTOLIC BLOOD PRESSURE: 84 MMHG | RESPIRATION RATE: 16 BRPM | HEART RATE: 74 BPM | SYSTOLIC BLOOD PRESSURE: 138 MMHG

## 2024-03-11 PROCEDURE — 99213 OFFICE O/P EST LOW 20 MIN: CPT

## 2024-03-11 NOTE — PLAN
[FreeTextEntry1] : Discussed pre- and post-operative nutritional recommendations. Reinforced healthy food choices with a focus on fresh whole foods, daily activity and mindful eating. All questions were answered.   The patient expressed understanding of all behavioral and nutritional recommendations and agrees to practice them with the understanding that success with these behaviors will be assessed at future visits.   F/u 1 month

## 2024-03-11 NOTE — PHYSICAL EXAM
[Obese, well nourished, in no acute distress] : obese, well nourished, in no acute distress [Normal] : affect appropriate [de-identified] : Normal respirations [de-identified] : Soft, nontender, nondistended

## 2024-03-11 NOTE — ASSESSMENT
[FreeTextEntry1] : 49-year-old female longstanding history of class IV obesity (BMI 51) with comorbidities of prediabetes and high cholesterol, presenting for continuing evaluation in preparation for planned laparoscopic sleeve gastrectomy.

## 2024-03-11 NOTE — HISTORY OF PRESENT ILLNESS
[de-identified] : Jaclyn is a 49 y/o female being seen today for follow up visit. No changes in medical history reported.  Continuing to moderate portion sizes and make more healthful choices.  Increasing activity levels as much as possible.  Has seen psychologist and dietitian.  Has another appointment scheduled, including GI, pulmonary, cardiology, and radiology appointments.

## 2024-03-18 ENCOUNTER — APPOINTMENT (OUTPATIENT)
Dept: CARDIOLOGY | Facility: CLINIC | Age: 49
End: 2024-03-18
Payer: COMMERCIAL

## 2024-03-18 VITALS
SYSTOLIC BLOOD PRESSURE: 113 MMHG | HEART RATE: 64 BPM | WEIGHT: 293 LBS | HEIGHT: 69 IN | DIASTOLIC BLOOD PRESSURE: 78 MMHG | BODY MASS INDEX: 43.4 KG/M2 | OXYGEN SATURATION: 98 %

## 2024-03-18 DIAGNOSIS — R01.1 CARDIAC MURMUR, UNSPECIFIED: ICD-10-CM

## 2024-03-18 DIAGNOSIS — E66.01 MORBID (SEVERE) OBESITY DUE TO EXCESS CALORIES: ICD-10-CM

## 2024-03-18 PROCEDURE — 93000 ELECTROCARDIOGRAM COMPLETE: CPT

## 2024-03-18 PROCEDURE — G2211 COMPLEX E/M VISIT ADD ON: CPT | Mod: NC,1L

## 2024-03-18 PROCEDURE — 99204 OFFICE O/P NEW MOD 45 MIN: CPT | Mod: 25

## 2024-03-18 RX ORDER — SODIUM SULFATE, POTASSIUM SULFATE AND MAGNESIUM SULFATE 1.6; 3.13; 17.5 G/177ML; G/177ML; G/177ML
17.5-3.13-1.6 SOLUTION ORAL TWICE DAILY
Qty: 2 | Refills: 0 | Status: DISCONTINUED | COMMUNITY
Start: 2022-09-20 | End: 2024-03-18

## 2024-03-18 RX ORDER — SEMAGLUTIDE 1.34 MG/ML
2 INJECTION, SOLUTION SUBCUTANEOUS
Qty: 1 | Refills: 1 | Status: DISCONTINUED | COMMUNITY
End: 2024-03-18

## 2024-03-18 RX ORDER — TIRZEPATIDE 2.5 MG/.5ML
2.5 INJECTION, SOLUTION SUBCUTANEOUS
Qty: 1 | Refills: 0 | Status: DISCONTINUED | COMMUNITY
Start: 2023-10-23 | End: 2024-03-18

## 2024-03-18 RX ORDER — SEMAGLUTIDE 0.25 MG/.5ML
0.25 INJECTION, SOLUTION SUBCUTANEOUS
Qty: 1 | Refills: 0 | Status: DISCONTINUED | COMMUNITY
Start: 2023-10-30 | End: 2024-03-18

## 2024-03-18 RX ORDER — VALACYCLOVIR 500 MG/1
500 TABLET, FILM COATED ORAL
Refills: 0 | Status: DISCONTINUED | COMMUNITY
Start: 2022-03-03 | End: 2024-03-18

## 2024-03-26 ENCOUNTER — APPOINTMENT (OUTPATIENT)
Dept: CARDIOLOGY | Facility: CLINIC | Age: 49
End: 2024-03-26
Payer: COMMERCIAL

## 2024-03-26 ENCOUNTER — APPOINTMENT (OUTPATIENT)
Dept: SURGERY | Facility: CLINIC | Age: 49
End: 2024-03-26
Payer: COMMERCIAL

## 2024-03-26 PROCEDURE — 97802 MEDICAL NUTRITION INDIV IN: CPT | Mod: 95

## 2024-03-26 PROCEDURE — 93306 TTE W/DOPPLER COMPLETE: CPT

## 2024-04-01 ENCOUNTER — APPOINTMENT (OUTPATIENT)
Dept: OBGYN | Facility: CLINIC | Age: 49
End: 2024-04-01
Payer: COMMERCIAL

## 2024-04-01 VITALS
HEIGHT: 69 IN | DIASTOLIC BLOOD PRESSURE: 81 MMHG | BODY MASS INDEX: 43.4 KG/M2 | SYSTOLIC BLOOD PRESSURE: 120 MMHG | WEIGHT: 293 LBS

## 2024-04-01 DIAGNOSIS — Z01.419 ENCOUNTER FOR GYNECOLOGICAL EXAMINATION (GENERAL) (ROUTINE) W/OUT ABNORMAL FINDINGS: ICD-10-CM

## 2024-04-01 PROCEDURE — 99396 PREV VISIT EST AGE 40-64: CPT

## 2024-04-01 NOTE — HISTORY OF PRESENT ILLNESS
[FreeTextEntry1] : Patient doing for bariatric surgery and here for steps prior to surgery [Patient reported PAP Smear was normal] : Patient reported PAP Smear was normal [Patient reported mammogram was normal] : Patient reported mammogram was normal [Mammogramdate] : 2023  [PapSmeardate] : 2022

## 2024-04-01 NOTE — PHYSICAL EXAM
[Chaperone Declined] : Patient declined chaperone [Appropriately responsive] : appropriately responsive [Alert] : alert [No Lymphadenopathy] : no lymphadenopathy [No Acute Distress] : no acute distress [Soft] : soft [Non-tender] : non-tender [Non-distended] : non-distended [No HSM] : No HSM [No Lesions] : no lesions [Oriented x3] : oriented x3 [No Mass] : no mass [FreeTextEntry7] : obese  [Examination Of The Breasts] : a normal appearance [No Masses] : no breast masses were palpable [Labia Minora] : normal [Labia Majora] : normal [Normal] : normal [Uterine Adnexae] : normal [FreeTextEntry5] : difficult to visualize

## 2024-04-02 ENCOUNTER — APPOINTMENT (OUTPATIENT)
Dept: RADIOLOGY | Facility: HOSPITAL | Age: 49
End: 2024-04-02

## 2024-04-02 ENCOUNTER — OUTPATIENT (OUTPATIENT)
Dept: OUTPATIENT SERVICES | Facility: HOSPITAL | Age: 49
LOS: 1 days | End: 2024-04-02
Payer: COMMERCIAL

## 2024-04-02 ENCOUNTER — APPOINTMENT (OUTPATIENT)
Dept: ULTRASOUND IMAGING | Facility: HOSPITAL | Age: 49
End: 2024-04-02

## 2024-04-02 DIAGNOSIS — E66.01 MORBID (SEVERE) OBESITY DUE TO EXCESS CALORIES: ICD-10-CM

## 2024-04-02 LAB
25(OH)D3 SERPL-MCNC: 30.1 NG/ML
ALBUMIN SERPL ELPH-MCNC: 4.3 G/DL
ALP BLD-CCNC: 116 U/L
ALT SERPL-CCNC: 21 U/L
ANION GAP SERPL CALC-SCNC: 14 MMOL/L
APTT BLD: 31.8 SEC
AST SERPL-CCNC: 15 U/L
BASOPHILS # BLD AUTO: 0.04 K/UL
BASOPHILS NFR BLD AUTO: 0.6 %
BILIRUB SERPL-MCNC: 0.4 MG/DL
BUN SERPL-MCNC: 13 MG/DL
C TRACH RRNA SPEC QL NAA+PROBE: NOT DETECTED
CALCIUM SERPL-MCNC: 9.5 MG/DL
CHLORIDE SERPL-SCNC: 100 MMOL/L
CO2 SERPL-SCNC: 25 MMOL/L
CREAT SERPL-MCNC: 0.75 MG/DL
EGFR: 98 ML/MIN/1.73M2
EOSINOPHIL # BLD AUTO: 0.08 K/UL
EOSINOPHIL NFR BLD AUTO: 1.1 %
ESTIMATED AVERAGE GLUCOSE: 123 MG/DL
FOLATE SERPL-MCNC: 11.7 NG/ML
GLUCOSE SERPL-MCNC: 90 MG/DL
HBA1C MFR BLD HPLC: 5.9 %
HBV SURFACE AG SER QL: NONREACTIVE
HCG SERPL QL: NEGATIVE
HCT VFR BLD CALC: 42 %
HCV AB SER QL: NONREACTIVE
HCV S/CO RATIO: 0.11 S/CO
HGB BLD-MCNC: 13.7 G/DL
HIV1+2 AB SPEC QL IA.RAPID: NONREACTIVE
HPV HIGH+LOW RISK DNA PNL CVX: NOT DETECTED
IMM GRANULOCYTES NFR BLD AUTO: 0.3 %
INR PPP: 0.98 RATIO
LYMPHOCYTES # BLD AUTO: 1.88 K/UL
LYMPHOCYTES NFR BLD AUTO: 26.3 %
MAN DIFF?: NORMAL
MCHC RBC-ENTMCNC: 30.2 PG
MCHC RBC-ENTMCNC: 32.6 GM/DL
MCV RBC AUTO: 92.5 FL
MONOCYTES # BLD AUTO: 0.49 K/UL
MONOCYTES NFR BLD AUTO: 6.9 %
N GONORRHOEA RRNA SPEC QL NAA+PROBE: NOT DETECTED
NEUTROPHILS # BLD AUTO: 4.63 K/UL
NEUTROPHILS NFR BLD AUTO: 64.8 %
PAPP-A SERPL-ACNC: <1 MIU/ML
PLATELET # BLD AUTO: 329 K/UL
POTASSIUM SERPL-SCNC: 4.3 MMOL/L
PROT SERPL-MCNC: 7.2 G/DL
PT BLD: 11.1 SEC
RBC # BLD: 4.54 M/UL
RBC # FLD: 12.5 %
SODIUM SERPL-SCNC: 139 MMOL/L
SOURCE TP AMPLIFICATION: NORMAL
T PALLIDUM AB SER QL IA: NEGATIVE
TSH SERPL-ACNC: 1.59 UIU/ML
VIT B12 SERPL-MCNC: 717 PG/ML
WBC # FLD AUTO: 7.14 K/UL

## 2024-04-02 PROCEDURE — 76700 US EXAM ABDOM COMPLETE: CPT

## 2024-04-02 PROCEDURE — 74246 X-RAY XM UPR GI TRC 2CNTRST: CPT | Mod: 26

## 2024-04-02 PROCEDURE — 76700 US EXAM ABDOM COMPLETE: CPT | Mod: 26

## 2024-04-02 PROCEDURE — 74246 X-RAY XM UPR GI TRC 2CNTRST: CPT

## 2024-04-04 LAB — VIT B1 SERPL-MCNC: 119.5 NMOL/L

## 2024-04-05 LAB — CYTOLOGY CVX/VAG DOC THIN PREP: NORMAL

## 2024-04-08 ENCOUNTER — APPOINTMENT (OUTPATIENT)
Dept: SURGERY | Facility: CLINIC | Age: 49
End: 2024-04-08
Payer: COMMERCIAL

## 2024-04-08 ENCOUNTER — RESULT REVIEW (OUTPATIENT)
Age: 49
End: 2024-04-08

## 2024-04-08 ENCOUNTER — OUTPATIENT (OUTPATIENT)
Dept: OUTPATIENT SERVICES | Facility: HOSPITAL | Age: 49
LOS: 1 days | End: 2024-04-08
Payer: COMMERCIAL

## 2024-04-08 ENCOUNTER — APPOINTMENT (OUTPATIENT)
Dept: MAMMOGRAPHY | Facility: IMAGING CENTER | Age: 49
End: 2024-04-08
Payer: COMMERCIAL

## 2024-04-08 ENCOUNTER — APPOINTMENT (OUTPATIENT)
Dept: GASTROENTEROLOGY | Facility: CLINIC | Age: 49
End: 2024-04-08
Payer: COMMERCIAL

## 2024-04-08 VITALS
HEIGHT: 69 IN | RESPIRATION RATE: 16 BRPM | WEIGHT: 293 LBS | OXYGEN SATURATION: 99 % | BODY MASS INDEX: 43.4 KG/M2 | TEMPERATURE: 97.3 F | DIASTOLIC BLOOD PRESSURE: 89 MMHG | SYSTOLIC BLOOD PRESSURE: 141 MMHG | HEART RATE: 76 BPM

## 2024-04-08 VITALS
BODY MASS INDEX: 43.4 KG/M2 | SYSTOLIC BLOOD PRESSURE: 131 MMHG | OXYGEN SATURATION: 95 % | WEIGHT: 293 LBS | HEIGHT: 69 IN | HEART RATE: 79 BPM | DIASTOLIC BLOOD PRESSURE: 80 MMHG

## 2024-04-08 DIAGNOSIS — K44.9 DIAPHRAGMATIC HERNIA W/OUT OBSTRUCTION OR GANGRENE: ICD-10-CM

## 2024-04-08 DIAGNOSIS — Z00.8 ENCOUNTER FOR OTHER GENERAL EXAMINATION: ICD-10-CM

## 2024-04-08 PROCEDURE — 77063 BREAST TOMOSYNTHESIS BI: CPT

## 2024-04-08 PROCEDURE — 99213 OFFICE O/P EST LOW 20 MIN: CPT

## 2024-04-08 PROCEDURE — 77067 SCR MAMMO BI INCL CAD: CPT

## 2024-04-08 PROCEDURE — 99203 OFFICE O/P NEW LOW 30 MIN: CPT

## 2024-04-08 PROCEDURE — 77063 BREAST TOMOSYNTHESIS BI: CPT | Mod: 26

## 2024-04-08 PROCEDURE — 77067 SCR MAMMO BI INCL CAD: CPT | Mod: 26

## 2024-04-08 NOTE — PLAN
[FreeTextEntry1] : Reviewed preoperative and postoperative dietary instructions. Reviewed importance of adhering to bariatric dietary instructions during the post-operative period. Reviewed importance of maintaining physical activity level before and after surgery to avoid complications.  All questions answered.

## 2024-04-08 NOTE — HISTORY OF PRESENT ILLNESS
[de-identified] : LYDIA is a 49 year old female presenting for her second monthly weight check prior to bariatric surgery.  No changes in medical history reported.  Continuing to moderate portion sizes and make more healthful choices.  Increasing activity levels as much as possible.  Endoscopy and stress test are both scheduled for later this week.

## 2024-04-08 NOTE — HISTORY OF PRESENT ILLNESS
[FreeTextEntry1] : Ms. Jaclyn Marcos is a 50 yo woman with morbid obesity who needs clearance upper endoscopy prior to planned gastric sleeve with Dr. Montero.  Generally does not have GI symptoms. A year ago she had a few episodes of nocturnal regurgitation but this was very out of character for her, and since that time she has not had heartburn, regurgitation, water brash, or dysphagia. She does not avoid any dietary triggers and does not have symptoms with spicy food, EtOH, etc. No abdominal pain, no nausea or vomiting. No bloody or black bowel movements, no changes in bowel habits. Had a normal colonoscopy in 2022 with repeat recommended in 5 years.  UGI series last week with small sliding HH, otherwise unremarkable.  She is not on any PPIs or anti-reflux/antacid medications.  No family history of gastric cancer, esophageal cancer, breast cancer, uterine cancer. Colon cancer in a grandparent. Denies NSAIDs, ASA, plavix, anticoagulant use. No history of ICD or pacemaker. Not on GLP-1 agonists. [de-identified] : UGI series 4/2/2024 FINDINGS:  Preliminary  radiograph of the abdomen demonstrates a nonobstructive bowel gas pattern.  The esophagus demonstrates normal distensibility, contour and course, without evidence of abnormal extrinsic mass effect.  Contrast passed unimpeded through the gastroesophageal junction into a normal-appearing stomach. The stomach demonstrates normal distensibility, and no mass or ulceration is identified. Small hiatal hernia. No gastroesophageal reflux is demonstrated.  No gastric outlet obstruction was appreciated. The duodenal bulb and sweep are unremarkable. There is no duodenal ulcer.  The mucosal pattern of the small bowel is normal. No intrinsic or extrinsic lesions of the small bowel are demonstrated. The terminal ileum is normal in appearance.   IMPRESSION:  Small sliding hiatal hernia. Otherwise unremarkable upper GI series.

## 2024-04-08 NOTE — PHYSICAL EXAM
[No Acute Distress] : no acute distress [Obese (BMI >= 30)] : obese (BMI >= 30) [Sclera] : the sclera and conjunctiva were normal [No Respiratory Distress] : no respiratory distress [Heart Rate And Rhythm] : heart rate was normal and rhythm regular [Abdomen Tenderness] : non-tender [No Masses] : no abdominal mass palpated [Abdomen Soft] : soft [Oriented To Time, Place, And Person] : oriented to person, place, and time

## 2024-04-08 NOTE — ASSESSMENT
[FreeTextEntry1] : 49F with morbid obesity presents for screening EGD prior to planned bariatric surgery.  #Planned gastric sleeve #Small HH seen on UGI series  - discussed upper endoscopy in order to assess for reflux esophagitis, BE, and measure HH; will also take biopsies of stomach to evaluate for IM and HP. She is amenable to proceeding with upper endoscopy. - as she has no GERD symptoms at this time and no reflux was seen on UGIS, will hold off on objective pH testing, similarly she has no symptoms of a motility disorder and does not require further evaluation for one at this time

## 2024-04-08 NOTE — PHYSICAL EXAM
[Obese, well nourished, in no acute distress] : obese, well nourished, in no acute distress [Normal] : affect appropriate [de-identified] : Normal respirations [de-identified] : Soft, nontender, nondistended

## 2024-04-10 ENCOUNTER — RESULT REVIEW (OUTPATIENT)
Age: 49
End: 2024-04-10

## 2024-04-11 ENCOUNTER — OUTPATIENT (OUTPATIENT)
Dept: OUTPATIENT SERVICES | Facility: HOSPITAL | Age: 49
LOS: 1 days | End: 2024-04-11
Payer: COMMERCIAL

## 2024-04-11 ENCOUNTER — APPOINTMENT (OUTPATIENT)
Dept: CV DIAGNOSTICS | Facility: HOSPITAL | Age: 49
End: 2024-04-11

## 2024-04-11 DIAGNOSIS — Z00.00 ENCOUNTER FOR GENERAL ADULT MEDICAL EXAMINATION WITHOUT ABNORMAL FINDINGS: ICD-10-CM

## 2024-04-11 PROCEDURE — 93018 CV STRESS TEST I&R ONLY: CPT

## 2024-04-11 PROCEDURE — 93017 CV STRESS TEST TRACING ONLY: CPT

## 2024-04-11 PROCEDURE — 93016 CV STRESS TEST SUPVJ ONLY: CPT

## 2024-04-15 ENCOUNTER — APPOINTMENT (OUTPATIENT)
Dept: CARDIOLOGY | Facility: CLINIC | Age: 49
End: 2024-04-15
Payer: COMMERCIAL

## 2024-04-15 VITALS
OXYGEN SATURATION: 100 % | BODY MASS INDEX: 43.4 KG/M2 | HEIGHT: 69 IN | SYSTOLIC BLOOD PRESSURE: 141 MMHG | WEIGHT: 293 LBS | HEART RATE: 72 BPM | DIASTOLIC BLOOD PRESSURE: 87 MMHG

## 2024-04-15 DIAGNOSIS — Z00.00 ENCOUNTER FOR GENERAL ADULT MEDICAL EXAMINATION W/OUT ABNORMAL FINDINGS: ICD-10-CM

## 2024-04-15 DIAGNOSIS — E78.00 PURE HYPERCHOLESTEROLEMIA, UNSPECIFIED: ICD-10-CM

## 2024-04-15 PROCEDURE — 93000 ELECTROCARDIOGRAM COMPLETE: CPT

## 2024-04-15 PROCEDURE — 99215 OFFICE O/P EST HI 40 MIN: CPT | Mod: 25

## 2024-04-15 PROCEDURE — G2211 COMPLEX E/M VISIT ADD ON: CPT | Mod: NC,1L

## 2024-04-21 PROBLEM — E66.01 OBESITY, MORBID, BMI 50 OR HIGHER: Status: ACTIVE | Noted: 2023-10-16

## 2024-04-21 NOTE — HISTORY OF PRESENT ILLNESS
[FreeTextEntry1] : 49 year old without past cardiac history or risks who presents for bariatric surgery clearance.  Pt denies CP, SOB or H/A

## 2024-04-21 NOTE — DISCUSSION/SUMMARY
[FreeTextEntry1] : HLD- stable on diet  Clearance- get exercise stress and ECHO  Followupin 3 weeks  Time spent revieiwng history, MD notes, exam, EKG, pt discussion and note writing [EKG obtained to assist in diagnosis and management of assessed problem(s)] : EKG obtained to assist in diagnosis and management of assessed problem(s)

## 2024-04-22 NOTE — DISCUSSION/SUMMARY
[FreeTextEntry1] : HLD- stable on diet  Clearance- NI tests negative.  Patient is at low risk for cardiac complications of noncardiac surgery  Followupin 6 mths  Time spent revieiwng history, MD notes, risk assessment, exam, EKG, pt discussion and note writing [EKG obtained to assist in diagnosis and management of assessed problem(s)] : EKG obtained to assist in diagnosis and management of assessed problem(s)

## 2024-04-22 NOTE — HISTORY OF PRESENT ILLNESS
[FreeTextEntry1] : 49 year old without past cardiac history or risks who presents for bariatric surgery clearance.  Pt denies CP, SOB or H/A.  Stress and ECHO were unremarkable

## 2024-04-24 NOTE — ASU PATIENT PROFILE, ADULT - FALL HARM RISK - UNIVERSAL INTERVENTIONS
Bed in lowest position, wheels locked, appropriate side rails in place/Call bell, personal items and telephone in reach/Instruct patient to call for assistance before getting out of bed or chair/Non-slip footwear when patient is out of bed/[place] to call system/Physically safe environment - no spills, clutter or unnecessary equipment/Purposeful Proactive Rounding/Room/bathroom lighting operational, light cord in reach

## 2024-04-25 ENCOUNTER — OUTPATIENT (OUTPATIENT)
Dept: OUTPATIENT SERVICES | Facility: HOSPITAL | Age: 49
LOS: 1 days | Discharge: HOME | End: 2024-04-25
Payer: COMMERCIAL

## 2024-04-25 ENCOUNTER — TRANSCRIPTION ENCOUNTER (OUTPATIENT)
Age: 49
End: 2024-04-25

## 2024-04-25 ENCOUNTER — APPOINTMENT (OUTPATIENT)
Dept: GASTROENTEROLOGY | Facility: HOSPITAL | Age: 49
End: 2024-04-25

## 2024-04-25 ENCOUNTER — RESULT REVIEW (OUTPATIENT)
Age: 49
End: 2024-04-25

## 2024-04-25 VITALS
HEART RATE: 67 BPM | WEIGHT: 293 LBS | SYSTOLIC BLOOD PRESSURE: 132 MMHG | TEMPERATURE: 98 F | OXYGEN SATURATION: 98 % | HEIGHT: 69 IN | RESPIRATION RATE: 11 BRPM | DIASTOLIC BLOOD PRESSURE: 77 MMHG

## 2024-04-25 VITALS
HEART RATE: 70 BPM | DIASTOLIC BLOOD PRESSURE: 69 MMHG | SYSTOLIC BLOOD PRESSURE: 125 MMHG | RESPIRATION RATE: 17 BRPM | OXYGEN SATURATION: 100 %

## 2024-04-25 DIAGNOSIS — K44.9 DIAPHRAGMATIC HERNIA WITHOUT OBSTRUCTION OR GANGRENE: ICD-10-CM

## 2024-04-25 LAB — HCG UR QL: NEGATIVE — SIGNIFICANT CHANGE UP

## 2024-04-25 PROCEDURE — 43239 EGD BIOPSY SINGLE/MULTIPLE: CPT

## 2024-04-25 PROCEDURE — 88342 IMHCHEM/IMCYTCHM 1ST ANTB: CPT | Mod: 26

## 2024-04-25 PROCEDURE — 88305 TISSUE EXAM BY PATHOLOGIST: CPT | Mod: 26

## 2024-04-25 RX ORDER — VALACYCLOVIR 500 MG/1
1 TABLET, FILM COATED ORAL
Refills: 0 | DISCHARGE

## 2024-04-25 NOTE — ASU DISCHARGE PLAN (ADULT/PEDIATRIC) - NS MD DC FALL RISK RISK
For information on Fall & Injury Prevention, visit: [email or web address] OR  [email or web address] OR  https://www.cdc.gov/steadi/patient.html

## 2024-05-10 LAB — SURGICAL PATHOLOGY STUDY: SIGNIFICANT CHANGE UP

## 2024-05-21 ENCOUNTER — NON-APPOINTMENT (OUTPATIENT)
Age: 49
End: 2024-05-21

## 2024-05-31 ENCOUNTER — APPOINTMENT (OUTPATIENT)
Dept: SURGERY | Facility: CLINIC | Age: 49
End: 2024-05-31
Payer: COMMERCIAL

## 2024-05-31 VITALS
HEIGHT: 69 IN | BODY MASS INDEX: 43.4 KG/M2 | SYSTOLIC BLOOD PRESSURE: 141 MMHG | RESPIRATION RATE: 17 BRPM | WEIGHT: 293 LBS | OXYGEN SATURATION: 99 % | TEMPERATURE: 98.6 F | HEART RATE: 78 BPM | DIASTOLIC BLOOD PRESSURE: 97 MMHG

## 2024-05-31 DIAGNOSIS — K22.70 BARRETT'S ESOPHAGUS W/OUT DYSPLASIA: ICD-10-CM

## 2024-05-31 DIAGNOSIS — E66.01 MORBID (SEVERE) OBESITY DUE TO EXCESS CALORIES: ICD-10-CM

## 2024-05-31 PROCEDURE — 99215 OFFICE O/P EST HI 40 MIN: CPT

## 2024-05-31 NOTE — PHYSICAL EXAM
[Obese, well nourished, in no acute distress] : obese, well nourished, in no acute distress [Normal] : affect appropriate [de-identified] : Normal respirations [de-identified] : Soft, nontender, nondistended

## 2024-05-31 NOTE — ASSESSMENT
[FreeTextEntry1] : 49-year-old female longstanding history of class IV obesity (BMI 51) with comorbidities of prediabetes, high cholesterol, MELBA, and Cruz's esophagus without dysplasia presenting for continuing evaluation in preparation for planned bariatric surgery.  She has agreed to proceed with laparoscopic Nya-en-Y gastric bypass with possible concurrent hiatal hernia repair.  Benefits and risks of the planned surgery were discussed in depth, particularly leak, bleeding, sepsis, blood clots, ulcer, malnutrition, internal hernia and complications related to adhesions, weight regain, prolonged hospital stay and the remote possibility of death.   Also discussed was the importance of adhering to the recommended nutritional guidelines and supplements and attending regular follow-up.  I reviewed the critical importance of behavioral modification and follow-up in order to optimize outcomes and avoid complications. The patient was given the opportunity to ask pertinent questions and expressed good understanding of the aforementioned issues.

## 2024-05-31 NOTE — PLAN
[FreeTextEntry1] : [] Laparoscopic Nya-en-Y gastric bypass to be scheduled following pulmonary clearance  Encouraged meal planning. Emphasized proper hydration while avoiding juice and soda.    The patient is committed to learning and incorporating diet and exercise modifications to optimize success after bariatric surgery.

## 2024-07-11 ENCOUNTER — APPOINTMENT (OUTPATIENT)
Dept: INTERNAL MEDICINE | Facility: CLINIC | Age: 49
End: 2024-07-11
Payer: COMMERCIAL

## 2024-07-11 ENCOUNTER — NON-APPOINTMENT (OUTPATIENT)
Age: 49
End: 2024-07-11

## 2024-07-11 VITALS
WEIGHT: 293 LBS | TEMPERATURE: 97.9 F | HEART RATE: 72 BPM | DIASTOLIC BLOOD PRESSURE: 82 MMHG | OXYGEN SATURATION: 96 % | SYSTOLIC BLOOD PRESSURE: 139 MMHG | BODY MASS INDEX: 43.4 KG/M2 | RESPIRATION RATE: 17 BRPM | HEIGHT: 69 IN

## 2024-07-11 DIAGNOSIS — Z01.818 ENCOUNTER FOR OTHER PREPROCEDURAL EXAMINATION: ICD-10-CM

## 2024-07-11 PROCEDURE — 93000 ELECTROCARDIOGRAM COMPLETE: CPT

## 2024-07-11 PROCEDURE — 99214 OFFICE O/P EST MOD 30 MIN: CPT | Mod: 25

## 2024-07-13 LAB
ALBUMIN SERPL ELPH-MCNC: 4.4 G/DL
ALT SERPL-CCNC: 38 U/L
ANION GAP SERPL CALC-SCNC: 18 MMOL/L
APPEARANCE: CLEAR
AST SERPL-CCNC: 32 U/L
BASOPHILS # BLD AUTO: 0.06 K/UL
BASOPHILS NFR BLD AUTO: 0.6 %
BILIRUB SERPL-MCNC: 0.3 MG/DL
BILIRUBIN URINE: NEGATIVE
BLOOD URINE: NEGATIVE
BUN SERPL-MCNC: 22 MG/DL
CALCIUM SERPL-MCNC: 9.6 MG/DL
CHLORIDE SERPL-SCNC: 102 MMOL/L
CO2 SERPL-SCNC: 21 MMOL/L
COLOR: YELLOW
CREAT SERPL-MCNC: 0.69 MG/DL
EGFR: 106 ML/MIN/1.73M2
EOSINOPHIL # BLD AUTO: 0.2 K/UL
GLUCOSE QUALITATIVE U: NEGATIVE MG/DL
GLUCOSE SERPL-MCNC: 66 MG/DL
HCT VFR BLD CALC: 42.6 %
HGB BLD-MCNC: 13.4 G/DL
IMM GRANULOCYTES NFR BLD AUTO: 0.4 %
INR PPP: 0.97 RATIO
KETONES URINE: NEGATIVE MG/DL
LEUKOCYTE ESTERASE URINE: NEGATIVE
LYMPHOCYTES # BLD AUTO: 2.7 K/UL
LYMPHOCYTES NFR BLD AUTO: 26.8 %
MAN DIFF?: NORMAL
MCHC RBC-ENTMCNC: 29.7 PG
MCHC RBC-ENTMCNC: 31.5 GM/DL
MCV RBC AUTO: 94.5 FL
MONOCYTES NFR BLD AUTO: 6.9 %
NEUTROPHILS # BLD AUTO: 6.37 K/UL
NEUTROPHILS NFR BLD AUTO: 63.3 %
NITRITE URINE: NEGATIVE
PH URINE: 5.5
PLATELET # BLD AUTO: 329 K/UL
POTASSIUM SERPL-SCNC: 4 MMOL/L
PROT SERPL-MCNC: 7.9 G/DL
PT BLD: 11 SEC
RBC # BLD: 4.51 M/UL
RBC # FLD: 13.2 %
SODIUM SERPL-SCNC: 141 MMOL/L
SPECIFIC GRAVITY URINE: 1.03
UROBILINOGEN URINE: 0.2 MG/DL
WBC # FLD AUTO: 10.06 K/UL

## 2024-07-15 ENCOUNTER — APPOINTMENT (OUTPATIENT)
Dept: SURGERY | Facility: CLINIC | Age: 49
End: 2024-07-15
Payer: COMMERCIAL

## 2024-07-15 VITALS
WEIGHT: 293 LBS | HEART RATE: 67 BPM | OXYGEN SATURATION: 99 % | RESPIRATION RATE: 17 BRPM | BODY MASS INDEX: 43.4 KG/M2 | DIASTOLIC BLOOD PRESSURE: 83 MMHG | HEIGHT: 69 IN | SYSTOLIC BLOOD PRESSURE: 129 MMHG | TEMPERATURE: 97.3 F

## 2024-07-15 PROCEDURE — 99214 OFFICE O/P EST MOD 30 MIN: CPT

## 2024-07-25 ENCOUNTER — TRANSCRIPTION ENCOUNTER (OUTPATIENT)
Age: 49
End: 2024-07-25

## 2024-07-25 ENCOUNTER — APPOINTMENT (OUTPATIENT)
Dept: SURGERY | Facility: HOSPITAL | Age: 49
End: 2024-07-25
Payer: COMMERCIAL

## 2024-07-25 PROCEDURE — 43644 LAP GASTRIC BYPASS/ROUX-EN-Y: CPT

## 2024-07-26 ENCOUNTER — TRANSCRIPTION ENCOUNTER (OUTPATIENT)
Age: 49
End: 2024-07-26

## 2024-08-01 ENCOUNTER — NON-APPOINTMENT (OUTPATIENT)
Age: 49
End: 2024-08-01

## 2024-08-09 ENCOUNTER — APPOINTMENT (OUTPATIENT)
Dept: SURGERY | Facility: CLINIC | Age: 49
End: 2024-08-09

## 2024-08-09 PROCEDURE — 99024 POSTOP FOLLOW-UP VISIT: CPT

## 2024-08-09 NOTE — PHYSICAL EXAM
[Obese, well nourished, in no acute distress] : obese, well nourished, in no acute distress [Normal] : affect appropriate [de-identified] : Normal respirations [de-identified] : Soft, nontender, nondistended.  Incisions well-healed without erythema or drainage.

## 2024-08-09 NOTE — ASSESSMENT
[FreeTextEntry1] : 49-year-old female recovering well status post laparoscopic Nya-en-Y gastric bypass on 7/25/2024

## 2024-08-09 NOTE — HISTORY OF PRESENT ILLNESS
[de-identified] : Jaclyn is a 48 y/o female here for first post op visit. S/p 07/25/2024- Laparoscopic Nya-en-Y gastric bypass. Recovering comfortably without major complaints.  No incisional pain.  No nausea.  Tolerating bariatric liquids, including protein shakes, without dysphagia or GERD.  Ambulating well.  No fevers or chills reported.

## 2024-08-09 NOTE — PLAN
[FreeTextEntry1] : [] Advance to pureed foods as per bariatric diet instructions at 2 weeks [] emphasized importance of maintaining excellent hydration, monitoring urine output and color, and keeping a bottle of water available at all times [] 1-2 protein shakes per day  [] walk as much as tolerated [] multivitamins recommended daily  F/u 2 weeks

## 2024-08-09 NOTE — HISTORY OF PRESENT ILLNESS
[de-identified] : Jaclyn is a 50 y/o female here for first post op visit. S/p 07/25/2024- Laparoscopic Nya-en-Y gastric bypass. Recovering comfortably without major complaints.  No incisional pain.  No nausea.  Tolerating bariatric liquids, including protein shakes, without dysphagia or GERD.  Ambulating well.  No fevers or chills reported.

## 2024-08-09 NOTE — PHYSICAL EXAM
[Obese, well nourished, in no acute distress] : obese, well nourished, in no acute distress [Normal] : affect appropriate [de-identified] : Normal respirations [de-identified] : Soft, nontender, nondistended.  Incisions well-healed without erythema or drainage.

## 2024-08-19 ENCOUNTER — APPOINTMENT (OUTPATIENT)
Dept: SURGERY | Facility: CLINIC | Age: 49
End: 2024-08-19
Payer: COMMERCIAL

## 2024-08-19 VITALS
HEIGHT: 69 IN | OXYGEN SATURATION: 99 % | TEMPERATURE: 98 F | WEIGHT: 293 LBS | HEART RATE: 67 BPM | DIASTOLIC BLOOD PRESSURE: 83 MMHG | RESPIRATION RATE: 17 BRPM | BODY MASS INDEX: 43.4 KG/M2 | SYSTOLIC BLOOD PRESSURE: 138 MMHG

## 2024-08-19 DIAGNOSIS — Z98.84 BARIATRIC SURGERY STATUS: ICD-10-CM

## 2024-08-19 DIAGNOSIS — E44.1 MILD PROTEIN-CALORIE MALNUTRITION: ICD-10-CM

## 2024-08-19 PROCEDURE — 99024 POSTOP FOLLOW-UP VISIT: CPT

## 2024-08-19 NOTE — PHYSICAL EXAM
[Obese, well nourished, in no acute distress] : obese, well nourished, in no acute distress [Normal] : affect appropriate [de-identified] : Normal respirations [de-identified] : Soft, nontender, nondistended.  Incisions well-healed without erythema or drainage.

## 2024-08-19 NOTE — HISTORY OF PRESENT ILLNESS
[de-identified] : Jaclyn is a 50 y/o female here for a follow up visit. S/p 07/25/2024 Laparoscopic Nya-en-Y gastric bypass. She continues to do well postoperatively.  She is tolerating pureed diet and reports no dysphagia or reflux.  She is walking for exercise.  Reports no abdominal pain or fevers.  No dumping symptoms.

## 2024-08-19 NOTE — PHYSICAL EXAM
[Obese, well nourished, in no acute distress] : obese, well nourished, in no acute distress [Normal] : affect appropriate [de-identified] : Normal respirations [de-identified] : Soft, nontender, nondistended.  Incisions well-healed without erythema or drainage.

## 2024-08-19 NOTE — HISTORY OF PRESENT ILLNESS
[de-identified] : Jaclyn is a 50 y/o female here for a follow up visit. S/p 07/25/2024 Laparoscopic Nya-en-Y gastric bypass. She continues to do well postoperatively.  She is tolerating pureed diet and reports no dysphagia or reflux.  She is walking for exercise.  Reports no abdominal pain or fevers.  No dumping symptoms.

## 2024-08-19 NOTE — PLAN
Rx for HyperSal will only be covered if joined with Albuterol concentrate. Rx on file is for Albuterol neb solution 0.83%.   David Jones
[FreeTextEntry1] : [] continue bariatric diet advancement per instructions [] reviewed importance of eating slowly, chewing thoroughly, stop eating when full [] recommend increasing cardiovascular exercise, goal 30 minutes per day [] continue multivitamins [] f/u in 2 months
[FreeTextEntry1] : [] continue bariatric diet advancement per instructions [] reviewed importance of eating slowly, chewing thoroughly, stop eating when full [] recommend increasing cardiovascular exercise, goal 30 minutes per day [] continue multivitamins [] f/u in 2 months

## 2024-08-26 ENCOUNTER — APPOINTMENT (OUTPATIENT)
Dept: SURGERY | Facility: CLINIC | Age: 49
End: 2024-08-26

## 2024-08-26 PROCEDURE — 97802 MEDICAL NUTRITION INDIV IN: CPT | Mod: 95

## (undated) DEVICE — DRSG CURITY GAUZE SPONGE 4 X 4" 12-PLY NON-STERILE

## (undated) DEVICE — DENTURE CUP PINK

## (undated) DEVICE — SALIVA EJECTOR (BLUE)

## (undated) DEVICE — TUBING MEDI-VAC W MAXIGRIP CONNECTORS 1/4"X6'

## (undated) DEVICE — BITE BLOCK ADULT 20 X 27MM (GREEN)

## (undated) DEVICE — CONTAINER FORMALIN 80ML YELLOW

## (undated) DEVICE — CATH IV SAFE BC 22G X 1" (BLUE)

## (undated) DEVICE — BASIN EMESIS 10IN GRADUATED MAUVE

## (undated) DEVICE — CLAMP BX HOT RAD JAW 3

## (undated) DEVICE — DRSG BANDAID 0.75X3"

## (undated) DEVICE — UNDERPAD LINEN SAVER 17 X 24"

## (undated) DEVICE — BIOPSY FORCEP COLD DISP

## (undated) DEVICE — LUBRICATING JELLY HR ONE SHOT 3G

## (undated) DEVICE — GOWN LG

## (undated) DEVICE — PACK IV START WITH CHG

## (undated) DEVICE — BIOPSY FORCEP RADIAL JAW 4 STANDARD WITH NEEDLE

## (undated) DEVICE — TUBING IV SET GRAVITY 3Y 100" MACRO

## (undated) DEVICE — DRSG 2X2

## (undated) DEVICE — ELCTR ECG CONDUCTIVE ADHESIVE